# Patient Record
Sex: FEMALE | Race: WHITE | Employment: OTHER | URBAN - METROPOLITAN AREA
[De-identification: names, ages, dates, MRNs, and addresses within clinical notes are randomized per-mention and may not be internally consistent; named-entity substitution may affect disease eponyms.]

---

## 2017-01-24 ENCOUNTER — HOSPITAL ENCOUNTER (OUTPATIENT)
Dept: ULTRASOUND IMAGING | Age: 72
Discharge: HOME | End: 2017-01-24
Attending: FAMILY MEDICINE
Payer: MEDICARE

## 2017-01-24 DIAGNOSIS — R10.9 ABDOMINAL PAIN: ICD-10-CM

## 2017-01-24 PROCEDURE — 76700 US EXAM ABDOM COMPLETE: CPT

## 2017-02-09 ENCOUNTER — HOSPITAL ENCOUNTER (OUTPATIENT)
Dept: CT IMAGING | Age: 72
Discharge: HOME | End: 2017-02-09
Attending: FAMILY MEDICINE
Payer: MEDICARE

## 2017-02-09 DIAGNOSIS — N28.89 VASCULAR DISORDERS OF KIDNEY: ICD-10-CM

## 2017-02-09 PROCEDURE — 74011636320 HC RX REV CODE- 636/320: Performed by: FAMILY MEDICINE

## 2017-02-09 PROCEDURE — 74170 CT ABD WO CNTRST FLWD CNTRST: CPT

## 2017-02-09 RX ADMIN — IOPAMIDOL 97 ML: 755 INJECTION, SOLUTION INTRAVENOUS at 09:01

## 2017-02-17 ENCOUNTER — HOSPITAL ENCOUNTER (OUTPATIENT)
Dept: PREADMISSION TESTING | Age: 72
Discharge: HOME | End: 2017-02-17
Payer: MEDICARE

## 2017-02-17 VITALS
SYSTOLIC BLOOD PRESSURE: 159 MMHG | WEIGHT: 230.6 LBS | BODY MASS INDEX: 37.06 KG/M2 | HEIGHT: 66 IN | DIASTOLIC BLOOD PRESSURE: 73 MMHG | TEMPERATURE: 97.5 F | OXYGEN SATURATION: 95 % | RESPIRATION RATE: 17 BRPM

## 2017-02-17 LAB
ANION GAP BLD CALC-SCNC: 7 MMOL/L (ref 5–15)
BUN SERPL-MCNC: 12 MG/DL (ref 6–20)
BUN/CREAT SERPL: 18 (ref 12–20)
CALCIUM SERPL-MCNC: 9 MG/DL (ref 8.5–10.1)
CHLORIDE SERPL-SCNC: 102 MMOL/L (ref 97–108)
CO2 SERPL-SCNC: 32 MMOL/L (ref 21–32)
CREAT SERPL-MCNC: 0.65 MG/DL (ref 0.55–1.02)
GLUCOSE SERPL-MCNC: 182 MG/DL (ref 65–100)
POTASSIUM SERPL-SCNC: 4.9 MMOL/L (ref 3.5–5.1)
SODIUM SERPL-SCNC: 141 MMOL/L (ref 136–145)

## 2017-02-17 PROCEDURE — 93005 ELECTROCARDIOGRAM TRACING: CPT

## 2017-02-17 PROCEDURE — 80048 BASIC METABOLIC PNL TOTAL CA: CPT | Performed by: ANESTHESIOLOGY

## 2017-02-17 PROCEDURE — 36415 COLL VENOUS BLD VENIPUNCTURE: CPT | Performed by: ANESTHESIOLOGY

## 2017-02-17 NOTE — PERIOP NOTES
Pt states she stopped her ASA 81 mg over a month ago (on her own instructions as she thought you should not take it before surgery) States ASA is not prescribed by cardiology

## 2017-02-17 NOTE — PERIOP NOTES
[facility]  PREOPERATIVE INSTRUCTIONS    Surgery Date:   2/23/2017  Surgery arrival time given by surgeon: NO   If [facility] staff will call you between 4 PM- 8 PM the day before surgery with your arrival time. If your surgery is on a Monday, we will call you the preceding Friday. Please call [phone] after 8 PM if you did not receive your arrival time. 1. Please report at the designated time to the [address] [place] [address] [facility]. Bring your insurance card, photo identification, and any copayment ( if applicable). 2. You must have a responsible adult to drive you home. You need to have a responsible adult to stay with you the first 24 hours after surgery if you are going home the same day of your surgery and you should not drive a car for 24 hours following your surgery. 3. Nothing to eat or drink after midnight the night before surgery. This includes no water, gum, mints, coffee, juice, etc.  Please note special instructions, if applicable, below for medications. 4. MEDICATIONS TO TAKE THE MORNING OF SURGERY WITH A SIP OF WATER: _____gabapentin, xanax if needed_________        If needed, your prescription pain medicine may be taken with a sip of water the morning of surgery  5. No alcoholic beverages 24 hours before or after your surgery. 6. If you are being admitted to the hospital, please leave personal belongings/luggage in your car until you have an assigned hospital room number. 7. Stop Aspirin and/or any non-steroidal anti-inflammatory drugs (i.e. Ibuprofen, Naproxen, Advil, Aleve) as directed by your surgeon. You may take Tylenol. 8. Stop herbal supplements 1 week prior to surgery. 9. If you are currently taking Plavix, Coumadin,or any other blood-thinning/anticoagulant medication contact your surgeon for instructions. 10. Please wear comfortable clothes. Wear your glasses instead of contacts. We ask that all money, jewelry and valuables be left at home.  Wear no make-up, particularly mascara, the day of surgery. 11.  All body piercings, rings and jewelry need to be removed and left at home. Please wear your hair loose or down. Please no pony-tails, buns, or any metal hair accessories. If you shower the morning of surgery, please do not apply any lotions, powders, or deodorants afterwards. Do not shave any body area within 24 hours of your surgery. 12. Please follow all instructions to avoid any potential surgical cancellation. 13. Should your physical condition change, (i.e. fever, cold, flu, etc.) please notify your surgeon as soon as possible. 14. It is important to be on time. If a situation occurs where you may be delayed, please call:  [phone] / [phone] on the day of surgery. 15. The Preadmission Testing staff can be reached at [phone]. Ivan Ochoa 12. Bring your completed Medication Reconciliation sheet with your the morning of surgery  Special instructions:   · Free  Parking between [address]  The patient was contacted  in person. She  verbalizes  understanding of all instructions   Medications reviewed and med reconciliation sheets for prescriptions given to patient for review & return day of surgery.   Diabetic patients:  If allowed by your surgeon, eat a good protein snack before midnight the night before your surgery  DO NOT TAKE ANY DIABETIC MEDICINE THE MORNING OF SURGERY  Check your blood sugar the morning of surgery and if it is low you may use glucose tabs  BRING your CPAP with you day of surgery

## 2017-02-18 LAB
ATRIAL RATE: 69 BPM
CALCULATED P AXIS, ECG09: 60 DEGREES
CALCULATED R AXIS, ECG10: 22 DEGREES
CALCULATED T AXIS, ECG11: 71 DEGREES
DIAGNOSIS, 93000: NORMAL
P-R INTERVAL, ECG05: 146 MS
Q-T INTERVAL, ECG07: 422 MS
QRS DURATION, ECG06: 74 MS
QTC CALCULATION (BEZET), ECG08: 452 MS
VENTRICULAR RATE, ECG03: 69 BPM

## 2017-02-22 ENCOUNTER — ANESTHESIA EVENT (OUTPATIENT)
Dept: SURGERY | Age: 72
End: 2017-02-22
Payer: MEDICARE

## 2017-02-23 ENCOUNTER — HOSPITAL ENCOUNTER (OUTPATIENT)
Age: 72
Setting detail: OUTPATIENT SURGERY
Discharge: HOME | End: 2017-02-23
Attending: SURGERY | Admitting: SURGERY
Payer: MEDICARE

## 2017-02-23 ENCOUNTER — ANESTHESIA (OUTPATIENT)
Dept: SURGERY | Age: 72
End: 2017-02-23
Payer: MEDICARE

## 2017-02-23 VITALS
HEIGHT: 66 IN | RESPIRATION RATE: 18 BRPM | OXYGEN SATURATION: 94 % | DIASTOLIC BLOOD PRESSURE: 50 MMHG | BODY MASS INDEX: 37.06 KG/M2 | WEIGHT: 230.6 LBS | TEMPERATURE: 98 F | SYSTOLIC BLOOD PRESSURE: 148 MMHG | HEART RATE: 85 BPM

## 2017-02-23 LAB
GLUCOSE BLD STRIP.AUTO-MCNC: 220 MG/DL (ref 65–100)
GLUCOSE BLD STRIP.AUTO-MCNC: 246 MG/DL (ref 65–100)
SERVICE CMNT-IMP: ABNORMAL
SERVICE CMNT-IMP: ABNORMAL

## 2017-02-23 PROCEDURE — 76010000131 HC OR TIME 2 TO 2.5 HR: Performed by: SURGERY

## 2017-02-23 PROCEDURE — 77030020747 HC TU INSUF ENDOSC TELE -A: Performed by: SURGERY

## 2017-02-23 PROCEDURE — 74011250636 HC RX REV CODE- 250/636: Performed by: SURGERY

## 2017-02-23 PROCEDURE — 77030035051: Performed by: SURGERY

## 2017-02-23 PROCEDURE — 77030032490 HC SLV COMPR SCD KNE COVD -B

## 2017-02-23 PROCEDURE — 77030025088 HC APPL CLP LIG 1 COVD -B: Performed by: SURGERY

## 2017-02-23 PROCEDURE — 77030031139 HC SUT VCRL2 J&J -A: Performed by: SURGERY

## 2017-02-23 PROCEDURE — 77030018836 HC SOL IRR NACL ICUM -A: Performed by: SURGERY

## 2017-02-23 PROCEDURE — 77030011640 HC PAD GRND REM COVD -A: Performed by: SURGERY

## 2017-02-23 PROCEDURE — 74011000250 HC RX REV CODE- 250: Performed by: ANESTHESIOLOGY

## 2017-02-23 PROCEDURE — 77030037032 HC INSRT SCIS CLICKLLINE DISP STOR -B: Performed by: SURGERY

## 2017-02-23 PROCEDURE — 88304 TISSUE EXAM BY PATHOLOGIST: CPT | Performed by: SURGERY

## 2017-02-23 PROCEDURE — 76210000022 HC REC RM PH II 1.5 TO 2 HR: Performed by: SURGERY

## 2017-02-23 PROCEDURE — 77030032490 HC SLV COMPR SCD KNE COVD -B: Performed by: SURGERY

## 2017-02-23 PROCEDURE — 76210000000 HC OR PH I REC 2 TO 2.5 HR: Performed by: SURGERY

## 2017-02-23 PROCEDURE — 74011250636 HC RX REV CODE- 250/636

## 2017-02-23 PROCEDURE — 77030009852 HC PCH RTVR ENDOSC COVD -B: Performed by: SURGERY

## 2017-02-23 PROCEDURE — 77030020782 HC GWN BAIR PAWS FLX 3M -B

## 2017-02-23 PROCEDURE — C9290 INJ, BUPIVACAINE LIPOSOME: HCPCS | Performed by: SURGERY

## 2017-02-23 PROCEDURE — 77030002933 HC SUT MCRYL J&J -A: Performed by: SURGERY

## 2017-02-23 PROCEDURE — 74011250636 HC RX REV CODE- 250/636: Performed by: ANESTHESIOLOGY

## 2017-02-23 PROCEDURE — C1765 ADHESION BARRIER: HCPCS | Performed by: SURGERY

## 2017-02-23 PROCEDURE — 76060000035 HC ANESTHESIA 2 TO 2.5 HR: Performed by: SURGERY

## 2017-02-23 PROCEDURE — 77030035045 HC TRCR ENDOSC VRSPRT BLDLSS COVD -B: Performed by: SURGERY

## 2017-02-23 PROCEDURE — 77030008684 HC TU ET CUF COVD -B: Performed by: ANESTHESIOLOGY

## 2017-02-23 PROCEDURE — 82962 GLUCOSE BLOOD TEST: CPT

## 2017-02-23 PROCEDURE — 74011250637 HC RX REV CODE- 250/637: Performed by: SURGERY

## 2017-02-23 PROCEDURE — 77030035048 HC TRCR ENDOSC OPTCL COVD -B: Performed by: SURGERY

## 2017-02-23 PROCEDURE — 74011000250 HC RX REV CODE- 250

## 2017-02-23 PROCEDURE — 77030008771 HC TU NG SALEM SUMP -A: Performed by: ANESTHESIOLOGY

## 2017-02-23 RX ORDER — POLYETHYLENE GLYCOL 3350 17 G/17G
17 POWDER, FOR SOLUTION ORAL DAILY
Qty: 7 PACKET | Refills: 0 | Status: SHIPPED | OUTPATIENT
Start: 2017-02-23 | End: 2018-11-13

## 2017-02-23 RX ORDER — SODIUM CHLORIDE 0.9 % (FLUSH) 0.9 %
5-10 SYRINGE (ML) INJECTION AS NEEDED
Status: DISCONTINUED | OUTPATIENT
Start: 2017-02-23 | End: 2017-02-23 | Stop reason: HOSPADM

## 2017-02-23 RX ORDER — HYDROMORPHONE HYDROCHLORIDE 1 MG/ML
.25-1 INJECTION, SOLUTION INTRAMUSCULAR; INTRAVENOUS; SUBCUTANEOUS
Status: DISCONTINUED | OUTPATIENT
Start: 2017-02-23 | End: 2017-02-23 | Stop reason: HOSPADM

## 2017-02-23 RX ORDER — PROPOFOL 10 MG/ML
INJECTION, EMULSION INTRAVENOUS AS NEEDED
Status: DISCONTINUED | OUTPATIENT
Start: 2017-02-23 | End: 2017-02-23 | Stop reason: HOSPADM

## 2017-02-23 RX ORDER — DEXAMETHASONE SODIUM PHOSPHATE 4 MG/ML
INJECTION, SOLUTION INTRA-ARTICULAR; INTRALESIONAL; INTRAMUSCULAR; INTRAVENOUS; SOFT TISSUE AS NEEDED
Status: DISCONTINUED | OUTPATIENT
Start: 2017-02-23 | End: 2017-02-23 | Stop reason: HOSPADM

## 2017-02-23 RX ORDER — IBUPROFEN 200 MG
600 TABLET ORAL
Qty: 90 TAB | Refills: 1 | Status: SHIPPED | OUTPATIENT
Start: 2017-02-23 | End: 2018-11-13

## 2017-02-23 RX ORDER — TRAMADOL HYDROCHLORIDE 50 MG/1
50 TABLET ORAL
Status: DISCONTINUED | OUTPATIENT
Start: 2017-02-23 | End: 2017-02-23 | Stop reason: HOSPADM

## 2017-02-23 RX ORDER — MIDAZOLAM HYDROCHLORIDE 1 MG/ML
INJECTION, SOLUTION INTRAMUSCULAR; INTRAVENOUS AS NEEDED
Status: DISCONTINUED | OUTPATIENT
Start: 2017-02-23 | End: 2017-02-23 | Stop reason: HOSPADM

## 2017-02-23 RX ORDER — HYDROCODONE BITARTRATE AND ACETAMINOPHEN 5; 325 MG/1; MG/1
1-2 TABLET ORAL
Qty: 40 TAB | Refills: 0 | Status: SHIPPED | OUTPATIENT
Start: 2017-02-23 | End: 2018-11-13

## 2017-02-23 RX ORDER — FENTANYL CITRATE 50 UG/ML
INJECTION, SOLUTION INTRAMUSCULAR; INTRAVENOUS AS NEEDED
Status: DISCONTINUED | OUTPATIENT
Start: 2017-02-23 | End: 2017-02-23 | Stop reason: HOSPADM

## 2017-02-23 RX ORDER — PROCHLORPERAZINE MALEATE 5 MG
5 TABLET ORAL
Status: DISCONTINUED | OUTPATIENT
Start: 2017-02-23 | End: 2017-02-23 | Stop reason: HOSPADM

## 2017-02-23 RX ORDER — PROCHLORPERAZINE MALEATE 5 MG
5 TABLET ORAL
Qty: 20 TAB | Refills: 1 | Status: SHIPPED | OUTPATIENT
Start: 2017-02-23 | End: 2017-03-02

## 2017-02-23 RX ORDER — KETOROLAC TROMETHAMINE 30 MG/ML
INJECTION, SOLUTION INTRAMUSCULAR; INTRAVENOUS AS NEEDED
Status: DISCONTINUED | OUTPATIENT
Start: 2017-02-23 | End: 2017-02-23 | Stop reason: HOSPADM

## 2017-02-23 RX ORDER — TRAMADOL HYDROCHLORIDE 50 MG/1
50-100 TABLET ORAL
Status: DISCONTINUED | OUTPATIENT
Start: 2017-02-23 | End: 2017-02-23

## 2017-02-23 RX ORDER — TRAMADOL HYDROCHLORIDE 50 MG/1
100 TABLET ORAL
Status: DISCONTINUED | OUTPATIENT
Start: 2017-02-23 | End: 2017-02-23 | Stop reason: HOSPADM

## 2017-02-23 RX ORDER — FLUMAZENIL 0.1 MG/ML
0.2 INJECTION INTRAVENOUS
Status: DISCONTINUED | OUTPATIENT
Start: 2017-02-23 | End: 2017-02-23 | Stop reason: HOSPADM

## 2017-02-23 RX ORDER — DIPHENHYDRAMINE HYDROCHLORIDE 50 MG/ML
12.5 INJECTION, SOLUTION INTRAMUSCULAR; INTRAVENOUS AS NEEDED
Status: DISCONTINUED | OUTPATIENT
Start: 2017-02-23 | End: 2017-02-23 | Stop reason: HOSPADM

## 2017-02-23 RX ORDER — TRAMADOL HYDROCHLORIDE AND ACETAMINOPHEN 37.5; 325 MG/1; MG/1
1-2 TABLET ORAL
Qty: 30 TAB | Refills: 0 | Status: SHIPPED | OUTPATIENT
Start: 2017-02-23 | End: 2018-11-13

## 2017-02-23 RX ORDER — NALOXONE HYDROCHLORIDE 0.4 MG/ML
0.2 INJECTION, SOLUTION INTRAMUSCULAR; INTRAVENOUS; SUBCUTANEOUS
Status: DISCONTINUED | OUTPATIENT
Start: 2017-02-23 | End: 2017-02-23 | Stop reason: HOSPADM

## 2017-02-23 RX ORDER — LIDOCAINE HYDROCHLORIDE 20 MG/ML
INJECTION, SOLUTION EPIDURAL; INFILTRATION; INTRACAUDAL; PERINEURAL AS NEEDED
Status: DISCONTINUED | OUTPATIENT
Start: 2017-02-23 | End: 2017-02-23 | Stop reason: HOSPADM

## 2017-02-23 RX ORDER — HYDROCODONE BITARTRATE AND ACETAMINOPHEN 5; 325 MG/1; MG/1
2 TABLET ORAL
Status: COMPLETED | OUTPATIENT
Start: 2017-02-23 | End: 2017-02-23

## 2017-02-23 RX ORDER — SODIUM CHLORIDE 0.9 % (FLUSH) 0.9 %
5-10 SYRINGE (ML) INJECTION EVERY 8 HOURS
Status: DISCONTINUED | OUTPATIENT
Start: 2017-02-23 | End: 2017-02-23 | Stop reason: HOSPADM

## 2017-02-23 RX ORDER — CEFAZOLIN SODIUM IN 0.9 % NACL 2 G/50 ML
2 INTRAVENOUS SOLUTION, PIGGYBACK (ML) INTRAVENOUS ONCE
Status: DISCONTINUED | OUTPATIENT
Start: 2017-02-23 | End: 2017-02-23 | Stop reason: HOSPADM

## 2017-02-23 RX ORDER — ROCURONIUM BROMIDE 10 MG/ML
INJECTION, SOLUTION INTRAVENOUS AS NEEDED
Status: DISCONTINUED | OUTPATIENT
Start: 2017-02-23 | End: 2017-02-23 | Stop reason: HOSPADM

## 2017-02-23 RX ORDER — SODIUM CHLORIDE, SODIUM LACTATE, POTASSIUM CHLORIDE, CALCIUM CHLORIDE 600; 310; 30; 20 MG/100ML; MG/100ML; MG/100ML; MG/100ML
125 INJECTION, SOLUTION INTRAVENOUS CONTINUOUS
Status: DISCONTINUED | OUTPATIENT
Start: 2017-02-23 | End: 2017-02-23 | Stop reason: HOSPADM

## 2017-02-23 RX ORDER — OXYCODONE AND ACETAMINOPHEN 5; 325 MG/1; MG/1
1 TABLET ORAL
Status: DISCONTINUED | OUTPATIENT
Start: 2017-02-23 | End: 2017-02-23

## 2017-02-23 RX ORDER — CEFAZOLIN SODIUM 1 G/3ML
INJECTION, POWDER, FOR SOLUTION INTRAMUSCULAR; INTRAVENOUS AS NEEDED
Status: DISCONTINUED | OUTPATIENT
Start: 2017-02-23 | End: 2017-02-23 | Stop reason: HOSPADM

## 2017-02-23 RX ORDER — ONDANSETRON 2 MG/ML
INJECTION INTRAMUSCULAR; INTRAVENOUS AS NEEDED
Status: DISCONTINUED | OUTPATIENT
Start: 2017-02-23 | End: 2017-02-23 | Stop reason: HOSPADM

## 2017-02-23 RX ORDER — LIDOCAINE HYDROCHLORIDE 10 MG/ML
0.1 INJECTION, SOLUTION EPIDURAL; INFILTRATION; INTRACAUDAL; PERINEURAL AS NEEDED
Status: DISCONTINUED | OUTPATIENT
Start: 2017-02-23 | End: 2017-02-23 | Stop reason: HOSPADM

## 2017-02-23 RX ADMIN — ROCURONIUM BROMIDE 30 MG: 10 INJECTION, SOLUTION INTRAVENOUS at 08:06

## 2017-02-23 RX ADMIN — HYDROMORPHONE HYDROCHLORIDE 1 MG: 1 INJECTION, SOLUTION INTRAMUSCULAR; INTRAVENOUS; SUBCUTANEOUS at 10:32

## 2017-02-23 RX ADMIN — FENTANYL CITRATE 100 MCG: 50 INJECTION, SOLUTION INTRAMUSCULAR; INTRAVENOUS at 08:06

## 2017-02-23 RX ADMIN — LIDOCAINE HYDROCHLORIDE 80 MG: 20 INJECTION, SOLUTION EPIDURAL; INFILTRATION; INTRACAUDAL; PERINEURAL at 08:06

## 2017-02-23 RX ADMIN — HYDROCODONE BITARTRATE AND ACETAMINOPHEN 2 TABLET: 5; 325 TABLET ORAL at 13:18

## 2017-02-23 RX ADMIN — MIDAZOLAM HYDROCHLORIDE 2 MG: 1 INJECTION, SOLUTION INTRAMUSCULAR; INTRAVENOUS at 07:57

## 2017-02-23 RX ADMIN — DEXAMETHASONE SODIUM PHOSPHATE 4 MG: 4 INJECTION, SOLUTION INTRA-ARTICULAR; INTRALESIONAL; INTRAMUSCULAR; INTRAVENOUS; SOFT TISSUE at 08:06

## 2017-02-23 RX ADMIN — HYDROMORPHONE HYDROCHLORIDE 1 MG: 1 INJECTION, SOLUTION INTRAMUSCULAR; INTRAVENOUS; SUBCUTANEOUS at 11:50

## 2017-02-23 RX ADMIN — PROPOFOL 150 MG: 10 INJECTION, EMULSION INTRAVENOUS at 08:06

## 2017-02-23 RX ADMIN — HYDROMORPHONE HYDROCHLORIDE 1 MG: 1 INJECTION, SOLUTION INTRAMUSCULAR; INTRAVENOUS; SUBCUTANEOUS at 10:15

## 2017-02-23 RX ADMIN — ROCURONIUM BROMIDE 10 MG: 10 INJECTION, SOLUTION INTRAVENOUS at 08:39

## 2017-02-23 RX ADMIN — SODIUM CHLORIDE, SODIUM LACTATE, POTASSIUM CHLORIDE, AND CALCIUM CHLORIDE 125 ML/HR: 600; 310; 30; 20 INJECTION, SOLUTION INTRAVENOUS at 07:03

## 2017-02-23 RX ADMIN — FENTANYL CITRATE 50 MCG: 50 INJECTION, SOLUTION INTRAMUSCULAR; INTRAVENOUS at 09:17

## 2017-02-23 RX ADMIN — CEFAZOLIN SODIUM 2 G: 1 INJECTION, POWDER, FOR SOLUTION INTRAMUSCULAR; INTRAVENOUS at 08:06

## 2017-02-23 RX ADMIN — SODIUM CHLORIDE 12.5 MG: 9 INJECTION INTRAMUSCULAR; INTRAVENOUS; SUBCUTANEOUS at 10:19

## 2017-02-23 RX ADMIN — FENTANYL CITRATE 50 MCG: 50 INJECTION, SOLUTION INTRAMUSCULAR; INTRAVENOUS at 08:29

## 2017-02-23 RX ADMIN — FENTANYL CITRATE 50 MCG: 50 INJECTION, SOLUTION INTRAMUSCULAR; INTRAVENOUS at 08:39

## 2017-02-23 RX ADMIN — KETOROLAC TROMETHAMINE 30 MG: 30 INJECTION, SOLUTION INTRAMUSCULAR; INTRAVENOUS at 09:33

## 2017-02-23 RX ADMIN — ONDANSETRON 4 MG: 2 INJECTION INTRAMUSCULAR; INTRAVENOUS at 09:34

## 2017-02-23 NOTE — ANESTHESIA PREPROCEDURE EVALUATION
Anesthetic History     PONV          Review of Systems / Medical History  Patient summary reviewed, nursing notes reviewed and pertinent labs reviewed    Pulmonary        Sleep apnea  Smoker         Neuro/Psych   Within defined limits           Cardiovascular    Hypertension                   GI/Hepatic/Renal  Within defined limits              Endo/Other    Diabetes    Morbid obesity and arthritis     Other Findings            Physical Exam    Airway  Mallampati: II  TM Distance: 4 - 6 cm  Neck ROM: normal range of motion   Mouth opening: Normal     Cardiovascular    Rhythm: regular  Rate: normal         Dental         Pulmonary  Breath sounds clear to auscultation               Abdominal         Other Findings            Anesthetic Plan    ASA: 2  Anesthesia type: general            Anesthetic plan and risks discussed with: Patient

## 2017-02-23 NOTE — BRIEF OP NOTE
BRIEF OPERATIVE NOTE    Date of Procedure: 2/23/2017   Preoperative Diagnosis: GALLSTONES  Postoperative Diagnosis: GALLSTONES, ADHESIONS  Procedure(s):  LAPAROSCOPIC CHOLECYSTECTOMY  Lysis of adhesions right lower quadrant (one hour)  Surgeon(s) and Role:     * Claudetta Hench, MD - Primary            Surgical Staff:  Circ-1: Kalyn Thomas RN  Circ-Relief: Emerson Mays RN  Circ-Intern: Ernestina Gómez RN  Scrub Tech-1: Luba Joshi Dent  Surg Asst-1: Rafa Shaffer  Event Time In   Incision Start 0827   Incision Close 0955     Anesthesia: General   Estimated Blood Loss: Min  Specimens:   ID Type Source Tests Collected by Time Destination   1 : gallbladder and contents Preservative Gallbladder  Claudetta Hench, MD 2/23/2017 8153 Pathology      Findings: Dilated gallbladder, adhesions in RLQ dissected free with scissors.    Complications: none  Implants: * No implants in log *

## 2017-02-23 NOTE — H&P
Change to paper history and physical:    Persistent RLQ pain. H/O BTL 39 y ago. Will evaluate RLQ for adhesions or issues in addition to cholecystectomy. Addendum added to paper history and physical, as well as consent form. Initialed by Ms. Chitra Buchanan after discussion.     Tan Mejia MD

## 2017-02-23 NOTE — IP AVS SNAPSHOT
Gracie Brunner 
 
 
 [address] 
[phone] Patient: Madelin Beckett MRN: [id number] CGD:[birth year] You are allergic to the following Allergen Reactions Byetta (Exenatide) Nausea and Vomiting Metformin Nausea and Vomiting Recent Documentation Height  
  
  
  
  
  
 1.676 m Emergency Contacts Name Discharge Info Relation Home Work Mobile [address] [place] [address] CAREGIVER [3] Son [22] [phone] About your hospitalization You were admitted on:  February 23, 2017 You last received care in the:  [facility] PACU You were discharged on:  February 23, 2017 Unit phone number:  [phone] Why you were hospitalized Your primary diagnosis was:  Not on File Providers Seen During Your Hospitalizations Provider Role Specialty Primary office phone Claudetta Hench, MD Attending Provider General Surgery [phone] Your Primary Care Physician (PCP) Primary Care Physician Office Phone Office Fax Nish Garcia [phone] Follow-up Information Follow up With Details Comments Contact Info Claudetta Hench, MD Schedule an appointment as soon as possible for a visit in 2 weeks  [address] [facility] [address] 
[phone] Merced Whitley MD   [address] [facility] [address] [place] [address] 
[phone] Current Discharge Medication List  
  
START taking these medications Dose & Instructions Dispensing Information Comments Morning Noon Evening Bedtime  
 docusate sodium 50 mg capsule Commonly known as:  Doc Kiara Your next dose is: Today, Tomorrow Other:  _________ Dose:  100 mg Take 2 Caps by mouth two (2) times a day for 90 days. Indications: Constipation Quantity:  120 Cap Refills:  2  
     
   
   
   
  
 ibuprofen 200 mg tablet Commonly known as:  MOTRIN Your next dose is: Today, Tomorrow Other:  _________ Dose:  600 mg Take 3 Tabs by mouth Before breakfast, lunch, and dinner. Indications: Pain Quantity:  90 Tab Refills:  1  
     
   
   
   
  
 polyethylene glycol 17 gram packet Commonly known as:  Kael Boer Your next dose is: Today, Tomorrow Other:  _________ Dose:  17 g Take 1 Packet by mouth daily. Indications: Constipation, If constipation last more than 24-48 hours, despite colace use. Quantity:  7 Packet Refills:  0  
     
   
   
   
  
 prochlorperazine 5 mg tablet Commonly known as:  COMPAZINE Your next dose is: Today, Tomorrow Other:  _________ Dose:  5 mg Take 1 Tab by mouth every six (6) hours as needed for up to 7 days. Indications: NAUSEA AND VOMITING Quantity:  20 Tab Refills:  1  
     
   
   
   
  
 traMADol-acetaminophen 37.5-325 mg per tablet Commonly known as:  ULTRACET Your next dose is: Today, Tomorrow Other:  _________ Dose:  1-2 Tab Take 1-2 Tabs by mouth every six (6) hours as needed for Pain. Max Daily Amount: 8 Tabs. Indications: Pain Quantity:  30 Tab Refills:  0 CONTINUE these medications which have NOT CHANGED Dose & Instructions Dispensing Information Comments Morning Noon Evening Bedtime  
 aspirin delayed-release 81 mg tablet Your next dose is: Today, Tomorrow Other:  _________ Take  by mouth daily. Refills:  0  
     
   
   
   
  
 GABAPENTIN (BULK) Your next dose is: Today, Tomorrow Other:  _________ Dose:  100 mg Take 100 mg by mouth two (2) times a day. Refills:  0  
     
   
   
   
  
 insulin detemir 100 unit/mL (3 mL) Inpn Commonly known as:  Vernia Goetz Your next dose is: Today, Tomorrow Other:  _________ Dose:  48 Units 48 Units by SubCUTAneous route Before breakfast and dinner. Refills:  0  
     
   
   
   
  
 lisinopril 5 mg tablet Commonly known as:  Marge Lisa Your next dose is: Today, Tomorrow Other:  _________ Take  by mouth daily. Refills:  0 NovoLOG Flexpen 100 unit/mL Inpn Generic drug:  insulin aspart Your next dose is: Today, Tomorrow Other:  _________ Dose:  10 Units 10 Units by SubCUTAneous route Before breakfast, lunch, and dinner. Refills:  0  
     
   
   
   
  
 traMADol 50 mg tablet Commonly known as:  ULTRAM  
   
Your next dose is: Today, Tomorrow Other:  _________ Dose:  50 mg Take 50 mg by mouth every six (6) hours as needed for Pain. Refills:  0  
     
   
   
   
  
 XANAX 1 mg tablet Generic drug:  ALPRAZolam  
   
Your next dose is: Today, Tomorrow Other:  _________ Dose:  0.5 mg Take 0.5 mg by mouth two (2) times daily as needed. Refills:  0 Where to Get Your Medications Information on where to get these meds will be given to you by the nurse or doctor. ! Ask your nurse or doctor about these medications  
  docusate sodium 50 mg capsule  
 ibuprofen 200 mg tablet  
 polyethylene glycol 17 gram packet  
 prochlorperazine 5 mg tablet  
 traMADol-acetaminophen 37.5-325 mg per tablet Discharge Instructions PATIENT INSTRUCTIONS 
GALL BLADDER SURGERY 
(CHOLECYSTECTOMY) FOLLOW-UP:  Please make an appointment with your physician in 2 week(s). Call your physician immediately if you have any fevers greater than 102.5, drainage from your wound that is not clear or looks infected, persistent bleeding, increasing abdominal pain, problems urinating, or persistent nausea/vomiting.   You should be aware that you may have right shoulder pain after surgery and that this will progressively go away. This is called 'referred pain' and is from the area of the gallbladder. It can also be caused by gas that may be trapped under the diaphragm from the surgery, especially if it was performed laparoscopically through mini-incisions. This gas will progressively get reabsorbed by your body. WOUND CARE INSTRUCTIONS:  Keep a dry clean dressing on the wound if there is drainage. The initial bandage may be removed after 24 hours. Once the wound has quit draining you may leave it open to air. If clothing rubs against the wound or causes irritation and the wound is not draining you may cover it with a dry dressing during the daytime. Try to keep the wound dry and avoid ointments on the wound unless directed to do so. If the wound becomes bright red and painful or starts to drain infected material that is not clear, please contact your physician immediately. You should also call if you begin to drain fluid that is thin and greenish-brown from the wound and appears to look like bile. If the wound though is mildly pink and has a thick firm ridge underneath it, this is normal, and is referred to as a healing ridge. This will resolve over the next 4-6 weeks. DIET:  You may eat any foods that you can tolerate. It is a good idea to eat a high fiber diet and take in plenty of fluids to prevent constipation. If you do become constipated you may want to take a mild laxative or take ducolax tablets on a daily basis until your bowel habits are regular. Constipation can be very uncomfortable, along with straining, after recent abdominal surgery. ACTIVITY:  You are encouraged to cough and deep breath or use your incentive spirometer if you were given one, every 15-30 minutes when awake. This will help prevent respiratory complications and low grade fevers post-operatively.   You may want to hug a pillow when coughing and sneezing to add additional support to the surgical area(s) which will decrease pain during these times. You are encouraged to walk and engage in light activity for the next two weeks. You should not lift more than 20 pounds during this time frame as it could put you at increased risk for a post-operative hernia. Twenty pounds is roughly equivalent to a plastic bag of groceries. MEDICATIONS:  Try to take narcotic medications and anti-inflammatory medications, such as tylenol, ibuprofen, naprosyn, etc., with food. This will minimize stomach upset from the medication. Should you develop nausea and vomiting from the pain medication, or develop a rash, please discontinue the medication and contact your physician. You should not drive, make important decisions, or operate machinery when taking narcotic pain medication. QUESTIONS:  Please feel free to call your physician or the hospital  if you have any questions, and they will be glad to assist you. DISCHARGE SUMMARY from your Nurse The following personal items collected during your admission are returned to you:  
Dental Appliance: Dental Appliances: None Vision:   
Hearing Aid:   
Jewelry: Jewelry: None Clothing: Clothing: Other (comment) (street clothes to locker) Other Valuables: Other Valuables: Eyeglasses (to locker) Valuables sent to safe:   
 
 
PATIENT INSTRUCTIONS: 
 
After general anesthesia or intravenous sedation, for 24 hours or while taking prescription Narcotics: · Limit your activities · Do not drive and operate hazardous machinery · Do not make important personal or business decisions · Do  not drink alcoholic beverages · If you have not urinated within 8 hours after discharge, please contact your surgeon on call. Report the following to your surgeon: 
· Excessive pain, swelling, redness or odor of or around the surgical area · Temperature over 100.5 · Nausea and vomiting lasting longer than 4 hours or if unable to take medications · Any signs of decreased circulation or nerve impairment to extremity: change in color, persistent  numbness, tingling, coldness or increase pain · Any questions INCENTIVE SPIROMETER Your nurse may send an incentive spirometer home with you to help you with your breathing. The incentive spirometer provides another way to make the lungs work better. DIRECTIONS: 
1. Exhale - begin with empty lungs. 2. Place lips around the mouthpiece; take a SLOW and DEEP breath in. 
3. Keep the small blue \"float\" on the RIGHT between the top and bottom arrows. If you suck the small blue float all the way to the top, YOU ARE CHEATING. SLOW DOWN when you breathe in. 
4. Your nurse will help you decide your target level for the big blue \"float\" that rises. Usually, that number is over the 1500 level. Your goal number is based upon your height and age, giving an estimate goal for your lung volume. 5. The arrow on the left side marks your goal.  Always try to go past your goal. Raise the arrow when you make a new goal.   
6. When you take a full deep breath in, hold it for 2 seconds. Exhale normally. Don't be afraid to push yourself; rest a little between each attempt. Use the Incentive spirometer 10 times every hour while awake. It is OK to break the 10 to 12 attempts into smaller numbers if this exercise makes your tired. For example, perform 2 times every 10 minutes. Below is information about the medications your doctor is prescribing after your visit: Ankle Pumps Ankle pumps increase the circulation of oxygenated blood to your lower extremities and decrease your risk for circulation problems such as blood clots. They also stretch the muscles, tendons and ligaments in your foot and ankle, and prevent joint contracture in the ankle and foot, especially after surgeries on the legs. It is important to do ankle pump exercises regularly after surgery because immobility increases your risk for developing a blood clot. Your doctor may also have you take an Aspirin for the next few days as well. If your doctor did not ask you to take an Aspirin, consult with him before starting Aspirin therapy on your own. The exercise is quite simple. 6. Slowly point your foot forward, feeling the muscles on the top of your lower leg stretch, and hold this position for 5 seconds. 7. Next, pull your foot back toward you as far as possible, stretching the calf muscles, and hold that position for 5 seconds. 8. Repeat with the other foot. 9. Perform 10 repetitions every hour while awake for both ankles if possible (down and then up with the foot once is one repetition). You should feel gentle stretching of the muscles in your lower leg when doing this exercise. If you feel pain, or your range of motion is limited, don't  Push too hard. Only go the limit your joint and muscles will let you go. If you have increasing pain, progressively worsening leg warmth or swelling, STOP the exercise and call your doctor. Other information in your discharge envelope: PRESCRIPTIONS PHYSICAL THERAPY PRESCRIPTION 
     APPOINTMENT CARDS [facility] Pamphlet for block or block with On-Q Catheter from Anesthesia Service Medical device information sheets/pamphlets from their  School/work excuse note. /parent work excuse note. These are general instructions for a healthy lifestyle: *  Please give a list of your current medications to your Primary Care Provider. *  Please update this list whenever your medications are discontinued, doses are 
    changed, or new medications (including over-the-counter products) are added. *  Please carry medication information at all times in case of emergency situations. No smoking / No tobacco products / Avoid exposure to second hand smoke Surgeon General's Warning:  Quitting smoking now greatly reduces serious risk to your health. Obesity, smoking, and sedentary lifestyle greatly increases your risk for illness and disease. A healthy diet, regular physical exercise & weight monitoring are important for maintaining a healthy lifestyle. Congestive Heart Failure You may be retaining fluid if you have a history of heart failure or if you experience any of the following symptoms:  Weight gain of 3 pounds or more overnight or 5 pounds in a week, increased swelling in our hands or feet or shortness of breath while lying flat in bed. Please call your doctor as soon as you notice any of these symptoms; do not wait until your next office visit. Recognize signs and symptoms of STROKE: 
F - face looks uneven A - arms unable to move or move even S - speech slurred or non-existent T - time-call 911 as soon as signs and symptoms begin-DO NOT go Back to bed or wait to see if you get better-TIME IS BRAIN. Warning signs of Heart Attack Call 911 if you have these symptoms: · Chest discomfort. Most heart attacks involve discomfort in the center of the chest that lasts more than a few minutes, or that goes away and comes back. It can feel like uncomfortable pressure, squeezing, fullness, or pain. · Discomfort in other areas of the upper body. Symptoms can include pain or discomfort in one or both Arms, the back, neck, jaw, or stomach. ·  Shortness of breath with or without chest discomfort. · Other signs may include breaking out in a cold sweat, nausea, or lightheadedness Don't wait more than five minutes to call 911 - MINUTES MATTER! Fast action can save your life. Calling 911 is almost always the fastest way to get lifesaving treatment.   Emergency Medical Services staff can begin treatment when they arrive - up to an hour sooner than if someone gets to the hospital by car. DAWNA LORENZANA MEDICATION AND SIDE EFFECTS GUIDE The [address] [facility] EFFECT GUIDE was provided to the PATIENT AND CARE PROVIDER. Information provided includes instruction about drug purpose and common side effects for the following medications: · Compazine · Miralax · Ibuprofen · Tramadol-acetaminophen · Docusate Discharge Orders None [employer] Announcement We are excited to announce that we are making your provider's discharge notes available to you in [employer]. You will see these notes when they are completed and signed by the physician that discharged you from your recent hospital stay. If you have any questions or concerns about any information you see in [employer], please call the Health Information Department where you were seen or reach out to your Primary Care Provider for more information about your plan of care. Introducing \[facility]\"" & HEALTH SERVICES! Lisandro Rutherford introduces [employer] patient portal. Now you can access parts of your medical record, email your doctor's office, and request medication refills online. 1. In your internet browser, go to [email or web address] 2. Click on the First Time User? Click Here link in the Sign In box. You will see the New Member Sign Up page. 3. Enter your [employer] Access Code exactly as it appears below. You will not need to use this code after youve completed the sign-up process. If you do not sign up before the expiration date, you must request a new code. · [employer] Access Code: [email or web address] Expires: 4/17/2017  3:41 PM 
 
4. Enter the last four digits of your Social Security Number (xxxx) and Date of Birth (mm/dd/yyyy) as indicated and click Submit. You will be taken to the next sign-up page. 5. Create a [employer] ID.  This will be your [employer] login ID and cannot be changed, so think of one that is secure and easy to remember. 6. Create a [employer] password. You can change your password at any time. 7. Enter your Password Reset Question and Answer. This can be used at a later time if you forget your password. 8. Enter your e-mail address. You will receive e-mail notification when new information is available in [address] 9. Click Sign Up. You can now view and download portions of your medical record. 10. Click the Download Summary menu link to download a portable copy of your medical information. If you have questions, please visit the Frequently Asked Questions section of the [employer] website. Remember, [employer] is NOT to be used for urgent needs. For medical emergencies, dial 911. Now available from your iPhone and Android! General Information Please provide this summary of care documentation to your next provider. Patient Signature:  ____________________________________________________________ Date:  ____________________________________________________________  
  
Yesi Moise Provider Signature:  ____________________________________________________________ Date:  ____________________________________________________________

## 2017-02-23 NOTE — DISCHARGE SUMMARY
Discharge Summary    Patient: Michael Hitchcock               Sex: female          DOA: 2/23/2017  6:13 AM       YOB: 1945      Age:  73 y.o.        LOS:  LOS: 0 days                Discharge Date:      Admission Diagnoses: GALLSTONES    Discharge Diagnoses:  Same, adhesions    Procedure:  Procedure(s):  LAPAROSCOPIC CHOLECYSTECTOMY, Lysis of adhesions right lower quadrant    Discharge Condition: Good    Hospital Course: Uremarkable operative procedure. Discharge to home in stable condition. Consults: None    Significant Diagnostic Studies: See full electronic record. Discharge Medications:     Current Discharge Medication List      START taking these medications    Details   prochlorperazine (COMPAZINE) 5 mg tablet Take 1 Tab by mouth every six (6) hours as needed for up to 7 days. Indications: NAUSEA AND VOMITING  Qty: 20 Tab, Refills: 1      polyethylene glycol (MIRALAX) 17 gram packet Take 1 Packet by mouth daily. Indications: Constipation, If constipation last more than 24-48 hours, despite colace use. Qty: 7 Packet, Refills: 0      docusate sodium (COLACE) 50 mg capsule Take 2 Caps by mouth two (2) times a day for 90 days. Indications: Constipation  Qty: 120 Cap, Refills: 2      ibuprofen (MOTRIN) 200 mg tablet Take 3 Tabs by mouth Before breakfast, lunch, and dinner. Indications: Pain  Qty: 90 Tab, Refills: 1      traMADol-acetaminophen (ULTRACET) 37.5-325 mg per tablet Take 1-2 Tabs by mouth every six (6) hours as needed for Pain. Max Daily Amount: 8 Tabs. Indications: Pain  Qty: 30 Tab, Refills: 0         CONTINUE these medications which have NOT CHANGED    Details   insulin detemir (LEVEMIR FLEXTOUCH) 100 unit/mL (3 mL) inpn 48 Units by SubCUTAneous route Before breakfast and dinner. traMADol (ULTRAM) 50 mg tablet Take 50 mg by mouth every six (6) hours as needed for Pain. GABAPENTIN, BULK, Take 100 mg by mouth two (2) times a day.       lisinopril (PRINIVIL, ZESTRIL) 5 mg tablet Take  by mouth daily. ALPRAZolam (XANAX) 1 mg tablet Take 0.5 mg by mouth two (2) times daily as needed. insulin aspart (NOVOLOG FLEXPEN) 100 unit/mL flexpen 10 Units by SubCUTAneous route Before breakfast, lunch, and dinner. aspirin delayed-release 81 mg tablet Take  by mouth daily. Activity/Diet/Wound Care: See patient administered discharge instructions.     Follow-up: 2 weeks    Debbie Deng MD  [facility]  Office:  [phone]

## 2017-02-23 NOTE — PERIOP NOTES
Report received from [facility], IV removed, pt assisted to wheelchair and out with family.

## 2017-02-23 NOTE — ANESTHESIA POSTPROCEDURE EVALUATION
Post-Anesthesia Evaluation and Assessment    Patient: Maryann Callahan MRN: [id number]  SSN: [id number]    YOB: 1945  Age: 73 y.o. Sex: female       Cardiovascular Function/Vital Signs  Visit Vitals    /70    Pulse 86    Temp 36.4 °C (97.5 °F)    Resp 14    Ht 5' 6\" (1.676 m)    Wt 104.6 kg (230 lb 9.6 oz)    SpO2 99%    BMI 37.22 kg/m2       Patient is status post general anesthesia for Procedure(s):  LAPAROSCOPIC CHOLECYSTECTOMY, Lysis of adhesions right lower quadrant. Nausea/Vomiting: None    Postoperative hydration reviewed and adequate. Pain:  Pain Scale 1: Numeric (0 - 10) (02/23/17 1151)  Pain Intensity 1: 6 (02/23/17 1151)   Managed    Neurological Status:   Neuro (WDL): Exceptions to WDL (02/23/17 1045)  Neuro  Neurologic State: Drowsy (02/23/17 1045)  LUE Motor Response: Tingling;Numbness (in hands at times) (02/23/17 0725)  RUE Motor Response: Numbness;Tingling (in hands at times) (02/23/17 0725)   At baseline    Mental Status and Level of Consciousness: Arousable    Pulmonary Status:   O2 Device: Nasal cannula (02/23/17 1045)   Adequate oxygenation and airway patent    Complications related to anesthesia: None    Post-anesthesia assessment completed.  No concerns    Signed By: Cherry Youssef MD     February 23, 2017

## 2017-03-11 NOTE — OP NOTES
OPERATIVE NOTE    Date of Procedure: 2/23/2017   Preoperative Diagnosis: SYMPTOMATIC CHOLELITHIASIS, RIGHT LOWER QUADRANT ABDOMINAL PAIN  Postoperative Diagnosis: SAME, ADHESIONS  Procedure(s):  LAPAROSCOPIC CHOLECYSTECTOMY  Lysis of adhesions right lower quadrant (one hour)  Surgeon(s) and Role:     * Paul Marie MD - Primary            Surgical Staff:  Circ-1: Fifi Graham RN  Circ-Relief: Michael Mays RN  Circ-Intern: Jonathon Cobos RN  Scrub Tech-1: Anika Moyer  Surg Asst-1: Rosa Galvan  Event Time In   Incision Start 0827   Incision Close 0955     Anesthesia: General   Estimated Blood Loss: Min  Specimens:   ID Type Source Tests Collected by Time Destination   1 : gallbladder and contents Preservative Gallbladder  Paul Marie MD 2/23/2017 7228 Pathology      Findings: Dilated gallbladder, adhesions in RLQ dissected free with scissors. Complications: none  Implants: * No implants in log *    Indication:  See paper H/P. Procedure:  I asked the patient to identify the location of abdominal pain in the holding area, and marked the area with an indelible marker. After standard pre-anesthetic and pre-operative procedure nursing protocols, general anesthesia instituted. Wth the patient supine on the operating table, the abdomen was cleaned, prepped, and draped in usual sterile fashion. The laparoscopic camera and CO2 insufflation apparatus were affixed to the drapes in the usual fashion. Pre-incision antibiotics were given 30 minutes prior to skin incision. Pre-incision liposomal bupivicaine and 0.5% plain marcaine anesthetic was injected in the usual port sites of the skin. Through a 5mm horizontal right para-umbilical skin incision, the abdomen was entered under direct camera vision with a 5 mm blunt tissue dissecting port. Insufflation was established satisfactorily and maintained at 15mm.   The laparoscopic camera was re-introduced and confirmed no gross evidence of intraabdominal visceral injury or bleeding in attaining access. Final midline horizontal port incisions were made, and under direct laparoscopic vision 5 mm and 12mm ventral ports were introduced. The patient was positioned in reverse Trendelenburg with left tilt. The fundus was grasped and lifted up towards the diaphragm. The infundibulum was retracted laterally and inferiorly. There was thickened peritoneum here and required meticulous dissection in order to completely free the infundibulum and cystic duct. The junction of the cystic duct and gallbladder were clearly identified, and an adequate length of the cystic duct was dissected out. Similarly, the cystic artery was also dissected out and an adequate length was displayed. The critical view of Calot's triangle was obtained and the structures were clearly identified. The cystic duct was clipped high on the infundibulum as possible. Proximal cystic duct was then clipped three times and divided with laparoscopic scissors. The cystic artery was clipped twice on the stay side and divided with laparoscopic scissors. With electrocautery, the gallbladder was then gently dissected completely from the liver bed and placed in a retrieval bag. Attention was turned to the right lower quadrant. There were intra-abdominal adhesions adjacent to the area marked on her skin surface in the right lower quadrant. The cecum and ascending colon were encased in adhesions. After an hour, I was able to laparoscopically cut away the adhesions to allow  a more normal position of the anti-mesenteric cecum and right colon. Hemostasis was satisfactory. The 5mm ports were removed under direct laparoscopic vision with no concern for preperitoneal or rectus arterial or venous bleeding. The remaining local anesthetic was injected in the pre-peritoneal plane, fascia and subcutaneous tissue at each trocar site under direct endoscopic vision.   The gallbladder, instruments and ports were removed from the field and pneumoperitoneum terminally released. All skin incisions were closed with subcuticular 4-0 Monocryl, steristrips and tegaderm. The patient awoke in satisfactory condition. I went to discuss the findings with her family members in the waiting area.       Selina Crandall MD

## 2017-03-17 ENCOUNTER — APPOINTMENT (OUTPATIENT)
Dept: GENERAL RADIOLOGY | Age: 72
End: 2017-03-17
Attending: EMERGENCY MEDICINE
Payer: MEDICARE

## 2017-03-17 ENCOUNTER — HOSPITAL ENCOUNTER (EMERGENCY)
Age: 72
Discharge: HOME | End: 2017-03-17
Attending: EMERGENCY MEDICINE
Payer: MEDICARE

## 2017-03-17 VITALS
OXYGEN SATURATION: 93 % | RESPIRATION RATE: 20 BRPM | TEMPERATURE: 97.9 F | HEIGHT: 66 IN | BODY MASS INDEX: 36.32 KG/M2 | SYSTOLIC BLOOD PRESSURE: 144 MMHG | HEART RATE: 83 BPM | DIASTOLIC BLOOD PRESSURE: 68 MMHG | WEIGHT: 226 LBS

## 2017-03-17 DIAGNOSIS — M79.602 PAIN OF LEFT UPPER EXTREMITY: Primary | ICD-10-CM

## 2017-03-17 PROCEDURE — 99282 EMERGENCY DEPT VISIT SF MDM: CPT

## 2017-03-17 PROCEDURE — 72050 X-RAY EXAM NECK SPINE 4/5VWS: CPT

## 2017-03-17 RX ORDER — PREDNISONE 20 MG/1
TABLET ORAL
Qty: 20 TAB | Refills: 0 | Status: SHIPPED | OUTPATIENT
Start: 2017-03-17 | End: 2018-11-13

## 2017-03-17 RX ORDER — DICLOFENAC SODIUM 50 MG/1
50 TABLET, DELAYED RELEASE ORAL 2 TIMES DAILY
Qty: 20 TAB | Refills: 0 | Status: SHIPPED | OUTPATIENT
Start: 2017-03-17 | End: 2018-11-13

## 2017-03-17 NOTE — DISCHARGE INSTRUCTIONS
Pinched Nerve in the Neck: Care Instructions  Your Care Instructions  A pinched nerve in the neck happens when a vertebra or disc in the upper part of your spine is damaged. This damage can happen because of an injury. Or it can just happen with age. The changes caused by the damage may put pressure on a nearby nerve root, pinching it. This causes symptoms such as sharp pain in your neck, shoulder, arm, or back. You may also have tingling or numbness. Sometimes it makes your arm weaker. The symptoms are usually worse when you turn your head or strain your neck. For many people, the symptoms get better over time and finally go away. Early treatment usually includes medicines for pain and swelling. Sometimes physical therapy and special exercises may help. Follow-up care is a key part of your treatment and safety. Be sure to make and go to all appointments, and call your doctor if you are having problems. It's also a good idea to know your test results and keep a list of the medicines you take. How can you care for yourself at home? · Be safe with medicines. Read and follow all instructions on the label. ¨ If the doctor gave you a prescription medicine for pain, take it as prescribed. ¨ If you are not taking a prescription pain medicine, ask your doctor if you can take an over-the-counter medicine. · Try using a heating pad on a low or medium setting for 15 to 20 minutes every 2 or 3 hours. Try a warm shower in place of one session with the heating pad. You can also buy single-use heat wraps that last up to 8 hours. · You can also try an ice pack for 10 to 15 minutes every 2 to 3 hours. There isn't strong evidence that either heat or ice will help. But you can try them to see if they help you. · Don't spend too long in one position. Take short breaks to move around and change positions. · Wear a seat belt and shoulder harness when you are in a car.   · Sleep with a pillow under your head and neck that keeps your neck straight. · If you were given a neck brace (cervical collar) to limit neck motion, wear it as instructed for as many days as your doctor tells you to. Do not wear it longer than you were told to. Wearing a brace for too long can lead to neck stiffness and can weaken the neck muscles. · Follow your doctor's instructions for gentle neck-stretching exercises. · Do not smoke. Smoking can slow healing of your discs. If you need help quitting, talk to your doctor about stop-smoking programs and medicines. These can increase your chances of quitting for good. · Avoid strenuous work or exercise until your doctor says it is okay. When should you call for help? Call 911 anytime you think you may need emergency care. For example, call if:  · You are unable to move an arm or a leg at all. Call your doctor now or seek immediate medical care if:  · You have new or worse symptoms in your arms, legs, chest, belly, or buttocks. Symptoms may include:  ¨ Numbness or tingling. ¨ Weakness. ¨ Pain. · You lose bladder or bowel control. Watch closely for changes in your health, and be sure to contact your doctor if:  · You are not getting better as expected. Where can you learn more? Go to http://mark-nicole.info/. Enter Y179 in the search box to learn more about \"Pinched Nerve in the Neck: Care Instructions. \"  Current as of: May 23, 2016  Content Version: 11.1  © 0268-5046 [employer]. Care instructions adapted under license by [employer] (which disclaims liability or warranty for this information). If you have questions about a medical condition or this instruction, always ask your healthcare professional. [place] [address] any warranty or liability for your use of this information.

## 2017-03-17 NOTE — ED TRIAGE NOTES
72 y/o female presents to ED complaining of intermittent left arm pain that starts at the fingers and radiates to shoulder x 2 months.

## 2017-03-17 NOTE — ED PROVIDER NOTES
HPI Comments: 73 y.o. female with past medical history significant for DM, arthritis, kidney mass, and anxiety/depression who presents from home with chief complaint of left arm pain. Pt complains of intermittent, non-mechanical, left shoulder and trapezius area pain with radiation to the the hand for the past 2 months. Pt describes that pain was b/l but right arm sxs recently subsided. Pt reports chronic tingling in her hands b/l every morning. Pt denies leg swelling. There are no other acute medical concerns at this time. Social hx: retired ; former tobacco smoker. PCP: Majo Covington MD    Note written by Arielle Kay. Nalini Rutherford, as dictated by Ami Leong MD 7:39 AM      The history is provided by the patient. No  was used. Past Medical History:   Diagnosis Date    Anxiety and depression     anxiety/depression    Arthritis     osteo    Chronic pain     knee pain,jointpain,muscle pain, Back pain    DM (diabetes mellitus) ([address]     Kidney mass     mass on kidney    Nausea & vomiting     Sleep apnea        Past Surgical History:   Procedure Laterality Date    BREAST SURGERY PROCEDURE UNLISTED      breast biopsy    HX OTHER SURGICAL      left foot surgery. 18srews, 3plates         Family History:   Problem Relation Age of Onset    Heart Disease Mother     Stroke Mother     Hypertension Father     Diabetes Sister        Social History     Social History    Marital status: SINGLE     Spouse name: N/A    Number of children: N/A    Years of education: N/A     Occupational History    Not on file.      Social History Main Topics    Smoking status: Former Smoker     Packs/day: 2.00     Years: 25.00     Quit date: 11/22/2002    Smokeless tobacco: Never Used    Alcohol use No    Drug use: No    Sexual activity: No     Other Topics Concern    Not on file     Social History Narrative         ALLERGIES: Byetta [exenatide] and Metformin    Review of Systems   Constitutional: Negative for fever. Eyes: Negative for visual disturbance. Respiratory: Negative for cough, shortness of breath and wheezing. Cardiovascular: Negative for chest pain and leg swelling. Gastrointestinal: Negative for abdominal pain, diarrhea, nausea and vomiting. Genitourinary: Negative for dysuria. Musculoskeletal: Negative. Negative for back pain and neck stiffness. L shoulder and trapezius area pain   Skin: Negative for rash. Neurological: Negative. Negative for syncope and headaches. Psychiatric/Behavioral: Negative for confusion. All other systems reviewed and are negative. Vitals:    03/17/17 0716   BP: 144/68   Pulse: 83   Resp: 20   Temp: 97.9 °F (36.6 °C)   SpO2: 93%   Weight: 102.5 kg (226 lb)   Height: 5' 6\" (1.676 m)            Physical Exam   Constitutional: She appears well-developed and well-nourished. No distress. HENT:   Head: Normocephalic. Eyes: Pupils are equal, round, and reactive to light. Neck: Normal range of motion. Neck supple. No muscular tenderness present. Cardiovascular: Normal rate, regular rhythm and normal pulses. No murmur heard. Pulmonary/Chest: Effort normal and breath sounds normal. No respiratory distress. Abdominal: Soft. There is no tenderness. Musculoskeletal: Normal range of motion. She exhibits no edema. Left arm non-tender   Neurological: She is alert. She has normal strength. No cranial nerve deficit. Normal ulnar radial nerve   Skin: Skin is warm and dry. Psychiatric: She has a normal mood and affect. Her behavior is normal.   Nursing note and vitals reviewed. Note written by Olivia Verde. Natividad Sims, as dictated by Hilda Paget, MD 7:36 AM        [facility]  ED Course       Procedures    Discussed test results, clinical impression,  and need for followup in 1-2 days if not improving. Patients questions were answered. Discharge instructions given to patient.

## 2018-02-07 ENCOUNTER — HOSPITAL ENCOUNTER (OUTPATIENT)
Dept: CT IMAGING | Age: 73
Discharge: HOME | End: 2018-02-07
Attending: FAMILY MEDICINE
Payer: SELF-PAY

## 2018-02-07 DIAGNOSIS — R07.89 OTHER CHEST PAIN: ICD-10-CM

## 2018-02-07 PROCEDURE — 75571 CT HRT W/O DYE W/CA TEST: CPT

## 2018-02-07 NOTE — CARDIO/PULMONARY
Cardiac Rehab: Spoke with patient about Calcium Scoring results 358. Discussed significance of results, and CAD risk factors. Pt reported that she already had appointment for Ca scoring scheduled. She had chest pain in January and went to [address] [place] [address] where Dr. Ethan Patino did cardiac cath (1/17/18) which was \"normal\" per patient. Pt reported he came today anyway and had CT scan. She reported she has HTN, high cholesterol, and DM; denies smoking. Discussed heart healthy/low sodium diet management and exercise. Pt indicated she would f/u with Dr Ethan Patino on 2/9/18 for post cath and needs to make appt with Dr. Lucina Marte as PCP. Ivonne Sands    Copy of report sent to patient, at his request.

## 2018-11-10 ENCOUNTER — HOSPITAL ENCOUNTER (EMERGENCY)
Age: 73
Discharge: HOME | DRG: 194 | End: 2018-11-10
Attending: EMERGENCY MEDICINE
Payer: MEDICARE

## 2018-11-10 ENCOUNTER — APPOINTMENT (OUTPATIENT)
Dept: CT IMAGING | Age: 73
DRG: 194 | End: 2018-11-10
Attending: EMERGENCY MEDICINE
Payer: MEDICARE

## 2018-11-10 ENCOUNTER — APPOINTMENT (OUTPATIENT)
Dept: GENERAL RADIOLOGY | Age: 73
DRG: 194 | End: 2018-11-10
Attending: EMERGENCY MEDICINE
Payer: MEDICARE

## 2018-11-10 VITALS
TEMPERATURE: 99.5 F | DIASTOLIC BLOOD PRESSURE: 84 MMHG | HEART RATE: 96 BPM | BODY MASS INDEX: 36.32 KG/M2 | WEIGHT: 226 LBS | RESPIRATION RATE: 21 BRPM | HEIGHT: 66 IN | OXYGEN SATURATION: 93 % | SYSTOLIC BLOOD PRESSURE: 192 MMHG

## 2018-11-10 DIAGNOSIS — R09.02 HYPOXIA: Primary | ICD-10-CM

## 2018-11-10 DIAGNOSIS — J18.9 COMMUNITY ACQUIRED PNEUMONIA, UNSPECIFIED LATERALITY: ICD-10-CM

## 2018-11-10 DIAGNOSIS — R04.2 HEMOPTYSIS: ICD-10-CM

## 2018-11-10 LAB
ANION GAP SERPL CALC-SCNC: 8 MMOL/L (ref 5–15)
BASOPHILS # BLD: 0 K/UL (ref 0–0.1)
BASOPHILS NFR BLD: 0 % (ref 0–1)
BNP SERPL-MCNC: 125 PG/ML (ref 0–125)
BUN SERPL-MCNC: 11 MG/DL (ref 6–20)
BUN/CREAT SERPL: 15 (ref 12–20)
CALCIUM SERPL-MCNC: 8.2 MG/DL (ref 8.5–10.1)
CHLORIDE SERPL-SCNC: 104 MMOL/L (ref 97–108)
CO2 SERPL-SCNC: 30 MMOL/L (ref 21–32)
CREAT SERPL-MCNC: 0.73 MG/DL (ref 0.55–1.02)
DIFFERENTIAL METHOD BLD: ABNORMAL
EOSINOPHIL # BLD: 0 K/UL (ref 0–0.4)
EOSINOPHIL NFR BLD: 0 % (ref 0–7)
ERYTHROCYTE [DISTWIDTH] IN BLOOD BY AUTOMATED COUNT: 12.5 % (ref 11.5–14.5)
GLUCOSE SERPL-MCNC: 219 MG/DL (ref 65–100)
HCT VFR BLD AUTO: 37.7 % (ref 35–47)
HGB BLD-MCNC: 12.1 G/DL (ref 11.5–16)
IMM GRANULOCYTES # BLD: 0.1 K/UL (ref 0–0.04)
IMM GRANULOCYTES NFR BLD AUTO: 1 % (ref 0–0.5)
LYMPHOCYTES # BLD: 1.5 K/UL (ref 0.8–3.5)
LYMPHOCYTES NFR BLD: 12 % (ref 12–49)
MCH RBC QN AUTO: 30.6 PG (ref 26–34)
MCHC RBC AUTO-ENTMCNC: 32.1 G/DL (ref 30–36.5)
MCV RBC AUTO: 95.2 FL (ref 80–99)
MONOCYTES # BLD: 1.2 K/UL (ref 0–1)
MONOCYTES NFR BLD: 9 % (ref 5–13)
NEUTS SEG # BLD: 10.1 K/UL (ref 1.8–8)
NEUTS SEG NFR BLD: 78 % (ref 32–75)
NRBC # BLD: 0 K/UL (ref 0–0.01)
NRBC BLD-RTO: 0 PER 100 WBC
PLATELET # BLD AUTO: 197 K/UL (ref 150–400)
PMV BLD AUTO: 11 FL (ref 8.9–12.9)
POTASSIUM SERPL-SCNC: 3.9 MMOL/L (ref 3.5–5.1)
RBC # BLD AUTO: 3.96 M/UL (ref 3.8–5.2)
SODIUM SERPL-SCNC: 142 MMOL/L (ref 136–145)
TROPONIN I SERPL-MCNC: <0.05 NG/ML
WBC # BLD AUTO: 12.9 K/UL (ref 3.6–11)

## 2018-11-10 PROCEDURE — 85025 COMPLETE CBC W/AUTO DIFF WBC: CPT

## 2018-11-10 PROCEDURE — 74011250637 HC RX REV CODE- 250/637: Performed by: EMERGENCY MEDICINE

## 2018-11-10 PROCEDURE — 74011000250 HC RX REV CODE- 250: Performed by: EMERGENCY MEDICINE

## 2018-11-10 PROCEDURE — 96360 HYDRATION IV INFUSION INIT: CPT

## 2018-11-10 PROCEDURE — 84484 ASSAY OF TROPONIN QUANT: CPT

## 2018-11-10 PROCEDURE — 83880 ASSAY OF NATRIURETIC PEPTIDE: CPT

## 2018-11-10 PROCEDURE — 93005 ELECTROCARDIOGRAM TRACING: CPT

## 2018-11-10 PROCEDURE — 99284 EMERGENCY DEPT VISIT MOD MDM: CPT

## 2018-11-10 PROCEDURE — 94640 AIRWAY INHALATION TREATMENT: CPT

## 2018-11-10 PROCEDURE — 71046 X-RAY EXAM CHEST 2 VIEWS: CPT

## 2018-11-10 PROCEDURE — 80048 BASIC METABOLIC PNL TOTAL CA: CPT

## 2018-11-10 PROCEDURE — 74011250636 HC RX REV CODE- 250/636: Performed by: EMERGENCY MEDICINE

## 2018-11-10 PROCEDURE — 36415 COLL VENOUS BLD VENIPUNCTURE: CPT

## 2018-11-10 PROCEDURE — 71275 CT ANGIOGRAPHY CHEST: CPT

## 2018-11-10 PROCEDURE — 74011636320 HC RX REV CODE- 636/320: Performed by: RADIOLOGY

## 2018-11-10 RX ORDER — LEVOFLOXACIN 750 MG/1
750 TABLET ORAL
Status: COMPLETED | OUTPATIENT
Start: 2018-11-10 | End: 2018-11-10

## 2018-11-10 RX ORDER — IPRATROPIUM BROMIDE AND ALBUTEROL SULFATE 2.5; .5 MG/3ML; MG/3ML
3 SOLUTION RESPIRATORY (INHALATION)
Status: COMPLETED | OUTPATIENT
Start: 2018-11-10 | End: 2018-11-10

## 2018-11-10 RX ORDER — LEVOFLOXACIN 750 MG/1
750 TABLET ORAL DAILY
Qty: 7 TAB | Refills: 0 | Status: SHIPPED | OUTPATIENT
Start: 2018-11-10 | End: 2018-11-13

## 2018-11-10 RX ORDER — ACETAMINOPHEN 500 MG
1000 TABLET ORAL ONCE
Status: COMPLETED | OUTPATIENT
Start: 2018-11-10 | End: 2018-11-10

## 2018-11-10 RX ADMIN — LEVOFLOXACIN 750 MG: 750 TABLET, FILM COATED ORAL at 20:33

## 2018-11-10 RX ADMIN — IPRATROPIUM BROMIDE AND ALBUTEROL SULFATE 3 ML: .5; 3 SOLUTION RESPIRATORY (INHALATION) at 18:59

## 2018-11-10 RX ADMIN — ACETAMINOPHEN 1000 MG: 500 TABLET ORAL at 20:32

## 2018-11-10 RX ADMIN — SODIUM CHLORIDE 500 ML: 900 INJECTION, SOLUTION INTRAVENOUS at 19:26

## 2018-11-10 RX ADMIN — IOPAMIDOL 100 ML: 755 INJECTION, SOLUTION INTRAVENOUS at 19:57

## 2018-11-10 NOTE — ED NOTES
SOB, productive cough with brown sputum, subjective fevers, sore throat. Recently completed a Z-[facility] course from [facility].  Patient having dyspnea w exertion and is unable to walk around her house without difficulty. 6:09 PM 
I have evaluated the patient as the Provider in Triage. I have reviewed Her vital signs and the triage nurse assessment. I have talked with the patient and any available family and advised that I am the provider in triage and have ordered the appropriate study to initiate their work up based on the clinical presentation during my assessment. I have advised that the patient will be accommodated in the Main ED as soon as possible. I have also requested to contact the triage nurse or myself immediately if the patient experiences any changes in their condition during this brief waiting period.  
Jimena Delgado MD

## 2018-11-11 LAB
ATRIAL RATE: 96 BPM
CALCULATED P AXIS, ECG09: 56 DEGREES
CALCULATED R AXIS, ECG10: 23 DEGREES
CALCULATED T AXIS, ECG11: 62 DEGREES
DIAGNOSIS, 93000: NORMAL
P-R INTERVAL, ECG05: 140 MS
Q-T INTERVAL, ECG07: 364 MS
QRS DURATION, ECG06: 70 MS
QTC CALCULATION (BEZET), ECG08: 459 MS
VENTRICULAR RATE, ECG03: 96 BPM

## 2018-11-11 NOTE — ED NOTES
I have reviewed discharge instructions with the patient. The patient verbalized understanding. Pt confirmed understanding of need for follow up with primary care provider. Pt is not in any current distress and shows no evidence of clinical instability. Pt left ambulatory  Pt family/friends not present. Pt left with all personal belongings. Pt states they are NOT driving. Pt states chief complaint has  improved with treatment provided PT is alert and oriented x 4, Respiratory status is productive cough, Cardiac system is HTN Paperwork given by provider and reviewed with patient, opportunity for questions/clarification given. Patient Vitals for the past 4 hrs: 
 Temp Pulse Resp BP SpO2  
11/10/18 2036 99.5 °F (37.5 °C) 96 21 192/84 93 % 11/10/18 1930    103/70 98 % 11/10/18 1908     (!) 88 % 11/10/18 1900    174/73 98 % 11/10/18 1806 99.7 °F (37.6 °C) 100 24 (!) 220/91 (!) 88 %

## 2018-11-11 NOTE — ED PROVIDER NOTES
'started coughing Wednesday (fine on tues)/ went to pt 1st/ also w/ 'blood'/ then went to brown/ today back to blood/ Im on day 3 of my Z pack but it is niot helping. Now Im even having trouble walking around the house'; pt denies HA, vison changes, diff swallowing, CP, SOB, Abd pain, F/Ch, N/V, D/Cons or other current systemic complaints The history is provided by the patient. Cough This is a new problem. The current episode started more than 2 days ago. The problem has been rapidly improving. The cough is productive of sputum, productive of brown sputum and productive of blood-tinged sputum. Patient reports a subjective fever - was not measured. The fever has been present for 1 - 2 days. Associated symptoms include sore throat and shortness of breath. Pertinent negatives include no chest pain, no chills, no sweats, no weight loss, no eye redness, no ear congestion, no ear pain, no headaches, no rhinorrhea, no myalgias, no wheezing, no nausea, no vomiting and no confusion. She has tried antibiotics for the symptoms. The treatment provided no relief. Smoker: 'quit 20 years ago' Her past medical history is significant for bronchitis. Her past medical history does not include pneumonia, bronchiectasis, COPD, emphysema, asthma, cancer, heart failure or CHF. Past medical history comments: Anxiety, DM, chronic pain. Past Medical History:  
Diagnosis Date  Anxiety and depression   
 anxiety/depression  Arthritis   
 osteo  Chronic pain   
 knee pain,jointpain,muscle pain, Back pain  DM (diabetes mellitus) ([address]  Kidney mass   
 mass on kidney  Nausea & vomiting  Sleep apnea Past Surgical History:  
Procedure Laterality Date  BREAST SURGERY PROCEDURE UNLISTED    
 breast biopsy  HX OTHER SURGICAL    
 left foot surgery. 18srews, 3plates Family History:  
Problem Relation Age of Onset  Heart Disease Mother  Stroke Mother  Hypertension Father  Diabetes Sister Social History Socioeconomic History  Marital status: SINGLE Spouse name: Not on file  Number of children: Not on file  Years of education: Not on file  Highest education level: Not on file Social Needs  Financial resource strain: Not on file  Food insecurity - worry: Not on file  Food insecurity - inability: Not on file  Transportation needs - medical: Not on file  Transportation needs - non-medical: Not on file Occupational History  Not on file Tobacco Use  Smoking status: Former Smoker Packs/day: 2.00 Years: 25.00 Pack years: 50.00 Last attempt to quit: 11/22/2002 Years since quitting: 15.9  Smokeless tobacco: Never Used Substance and Sexual Activity  Alcohol use: No  
  Alcohol/week: 0.6 oz Types: 1 Cans of beer per week  Drug use: No  
 Sexual activity: No  
Other Topics Concern  Not on file Social History Narrative  Not on file ALLERGIES: Byetta [exenatide] and Metformin Review of Systems Constitutional: Positive for fatigue and fever. Negative for appetite change, chills, diaphoresis, unexpected weight change and weight loss. HENT: Positive for sore throat. Negative for ear pain, rhinorrhea, trouble swallowing and voice change. Eyes: Negative for redness. Respiratory: Positive for cough and shortness of breath. Negative for chest tightness and wheezing. Cardiovascular: Negative for chest pain, palpitations and leg swelling. Gastrointestinal: Negative for abdominal pain, diarrhea, nausea and vomiting. Genitourinary: Negative for dysuria. Musculoskeletal: Negative for myalgias. Neurological: Negative for seizures, speech difficulty, weakness and headaches. Psychiatric/Behavioral: Negative for confusion. All other systems reviewed and are negative. Vitals:  
 11/10/18 1806 11/10/18 1908 BP: (!) 220/91 Pulse: 100 Resp: 24 Temp: 99.7 °F (37.6 °C) SpO2: (!) 88% (!) 88% Weight: 102.5 kg (226 lb) Height: 5' 6\" (1.676 m) Physical Exam  
Constitutional: She is oriented to person, place, and time. She appears well-developed and well-nourished. No distress. M obese, NAD, AxOx4, speaking in complete sentences HENT:  
Head: Normocephalic and atraumatic. Right Ear: External ear normal.  
Left Ear: External ear normal.  
Nose: Nose normal.  
Mouth/Throat: Oropharynx is clear and moist. No oropharyngeal exudate. Eyes: Conjunctivae and EOM are normal. Pupils are equal, round, and reactive to light. Right eye exhibits no discharge. Left eye exhibits no discharge. No scleral icterus. Neck: Normal range of motion. Neck supple. No JVD present. No tracheal deviation present. Cardiovascular: Normal rate, regular rhythm, normal heart sounds and intact distal pulses. Exam reveals no gallop and no friction rub. No murmur heard. Pulmonary/Chest: Effort normal and breath sounds normal. No stridor. No respiratory distress. She has no wheezes. She has no rales. She exhibits no tenderness. Abdominal: Soft. Bowel sounds are normal. There is no tenderness. There is no rebound and no guarding. nttp Genitourinary: No vaginal discharge found. Genitourinary Comments: Pt denies urinary/ vaginal complaints Musculoskeletal: Normal range of motion. She exhibits no edema, tenderness or deformity. Neurological: She is alert and oriented to person, place, and time. No cranial nerve deficit. She exhibits normal muscle tone. Coordination normal.  
pt has motor/ CV/ Sensation grossly intact to all extremities, R = L in strength;  
Skin: Skin is warm and dry. Capillary refill takes 2 to 3 seconds. No rash noted. No erythema. No pallor. Psychiatric: She has a normal mood and affect. Her behavior is normal. Thought content normal.  
Nursing note and vitals reviewed. MDM Procedures Chief Complaint Patient presents with  Shortness of Breath  
 
 
7:51 PM 
The patients presenting problems have been discussed, and they are in agreement with the care plan formulated and outlined with them. I have encouraged them to ask questions as they arise throughout their visit. MEDICATIONS GIVEN: 
Medications  
sodium chloride 0.9 % bolus infusion 500 mL (500 mL IntraVENous New Bag 11/10/18 1926) iopamidol (ISOVUE-370) 76 % injection 100 mL (not administered)  
albuterol-ipratropium (DUO-NEB) 2.5 MG-0.5 MG/3 ML (3 mL Nebulization Given 11/10/18 1799) LABS REVIEWED: 
Labs Reviewed CBC WITH AUTOMATED DIFF - Abnormal; Notable for the following components:  
    Result Value WBC 12.9 (*) NEUTROPHILS 78 (*) IMMATURE GRANULOCYTES 1 (*)   
 ABS. NEUTROPHILS 10.1 (*)   
 ABS. MONOCYTES 1.2 (*)   
 ABS. IMM. GRANS. 0.1 (*) All other components within normal limits METABOLIC PANEL, BASIC - Abnormal; Notable for the following components:  
 Glucose 219 (*) Calcium 8.2 (*) All other components within normal limits NT-PRO BNP  
TROPONIN I  
 
 
RADIOLOGY RESULTS: 
The following have been ordered and reviewed: 
_____________________________________________________________________ 
_____________________________________________________________________ EKG interpretation:  
Rhythm: normal sinus rhythm; and regular . Rate (approx.): 96; Axis: normal; P wave: normal; QRS interval: normal ; ST/T wave: normal; Negative acute significant segmental elevations/ unchanged compared to study dated 02/17/2017 save increased rate;  
 
PROCEDURES: 
 
 
 
CONSULTATIONS:  
 
 
PROGRESS NOTES: 
 
DIAGNOSIS: 
 
1. Hypoxia 2. Hemoptysis PLAN: 
1- CAP - will change rx to Levaquin/ offered/ refused admission/ 'will use my CPAP'; agrees w/ plans; pulm f/up;  
 
 
ED COURSE: The patients hospital course has been uncomplicated.  
 
 
 8:26 PM 'feeling better now'; discussed hypoxia/ 'Cathie had this before/ saw a pulmonologist and everything was OK'; Offered/ refused admission/ agrees w/ plans  Karyn Albrecht  results have been reviewed with her. She has been counseled regarding her diagnosis. She verbally conveys understanding and agreement of the signs, symptoms, diagnosis, treatment and prognosis and additionally agrees to Call/ Arrange follow up as recommended with Dr. Wilfrid Myers MD in 24 - 48 hours. She also agrees with the care-plan and conveys that all of her questions have been answered. I have also put together some discharge instructions for her that include: 1) educational information regarding their diagnosis, 2) how to care for their diagnosis at home, as well a 3) list of reasons why they would want to return to the ED prior to their follow-up appointment, should their condition change or for concerns.

## 2018-11-11 NOTE — DISCHARGE INSTRUCTIONS
Pneumonia: Care Instructions  Your Care Instructions    Pneumonia is an infection of the lungs. Most cases are caused by infections from bacteria or viruses. Pneumonia may be mild or very severe. If it is caused by bacteria, you will be treated with antibiotics. It may take a few weeks to a few months to recover fully from pneumonia, depending on how sick you were and whether your overall health is good. Follow-up care is a key part of your treatment and safety. Be sure to make and go to all appointments, and call your doctor if you are having problems. It's also a good idea to know your test results and keep a list of the medicines you take. How can you care for yourself at home? · Take your antibiotics exactly as directed. Do not stop taking the medicine just because you are feeling better. You need to take the full course of antibiotics. · Take your medicines exactly as prescribed. Call your doctor if you think you are having a problem with your medicine. · Get plenty of rest and sleep. You may feel weak and tired for a while, but your energy level will improve with time. · To prevent dehydration, drink plenty of fluids, enough so that your urine is light yellow or clear like water. Choose water and other caffeine-free clear liquids until you feel better. If you have kidney, heart, or liver disease and have to limit fluids, talk with your doctor before you increase the amount of fluids you drink. · Take care of your cough so you can rest. A cough that brings up mucus from your lungs is common with pneumonia. It is one way your body gets rid of the infection. But if coughing keeps you from resting or causes severe fatigue and chest-wall pain, talk to your doctor. He or she may suggest that you take a medicine to reduce the cough. · Use a vaporizer or humidifier to add moisture to your bedroom. Follow the directions for cleaning the machine. · Do not smoke or allow others to smoke around you.  Smoke will make your cough last longer. If you need help quitting, talk to your doctor about stop-smoking programs and medicines. These can increase your chances of quitting for good. · Take an over-the-counter pain medicine, such as acetaminophen (Tylenol), ibuprofen (Advil, Motrin), or naproxen (Aleve). Read and follow all instructions on the label. · Do not take two or more pain medicines at the same time unless the doctor told you to. Many pain medicines have acetaminophen, which is Tylenol. Too much acetaminophen (Tylenol) can be harmful. · If you were given a spirometer to measure how well your lungs are working, use it as instructed. This can help your doctor tell how your recovery is going. · To prevent pneumonia in the future, talk to your doctor about getting a flu vaccine (once a year) and a pneumococcal vaccine (one time only for most people). When should you call for help? Call 911 anytime you think you may need emergency care. For example, call if:    · You have severe trouble breathing.    Call your doctor now or seek immediate medical care if:    · You cough up dark brown or bloody mucus (sputum).     · You have new or worse trouble breathing.     · You are dizzy or lightheaded, or you feel like you may faint.    Watch closely for changes in your health, and be sure to contact your doctor if:    · You have a new or higher fever.     · You are coughing more deeply or more often.     · You are not getting better after 2 days (48 hours).     · You do not get better as expected. Where can you learn more? Go to http://mark-nicole.info/. Enter [id number] in the search box to learn more about \"Pneumonia: Care Instructions. \"  Current as of: December 6, 2017  Content Version: 11.8  © 0054-4603 Healthwise, [employer]. Care instructions adapted under license by [employer] (which disclaims liability or warranty for this information).  If you have questions about a medical condition or this instruction, always ask your healthcare professional. [address] any warranty or liability for your use of this information. Learning About Hypoxemia  What is hypoxemia? Hypoxemia means that you don't have enough oxygen in your blood. It's a result of diseases that affect your heart or lungs. These include heart failure, COPD, and pulmonary fibrosis (scarring of the lungs). Being at high altitudes can also lead to hypoxemia. What happens when you have hypoxemia? Oxygen gets into your blood through your lungs. Your blood carries the oxygen to all parts of your body. When you have too little oxygen in your blood, your body doesn't get enough of it. With too little oxygen, your heart and other parts of your body don't work very well. What are the symptoms? In addition to the symptoms of whatever is causing your hypoxemia, you may:  · Get tired quickly. · Be short of breath when you are active. · Feel like your heart is pounding or racing. · Feel weak or dizzy. · Become confused. How is hypoxemia treated? Your doctor will do tests to find out how much oxygen is in your blood. He or she will look for the cause of your hypoxemia and treat that problem. For example, if you have heart failure, you may need medicines that help your heart pump better. · If your hypoxemia is not severe, your doctor may give you oxygen through a mask or nasal cannula (say \"MARIELA-yuh-randy\"). A cannula is a thin tube with two openings that fit just inside your nose. · If your hypoxemia is severe, you may have a breathing tube put into your windpipe. The breathing tube is attached to a machine that pushes air into your lungs. This machine is called a ventilator. · If you have a long-term problem with hypoxemia, your doctor may recommend that you use oxygen regularly. Some people need it all the time. Others need it from time to time throughout the day or overnight.  Your doctor will tell you how much oxygen you need and how often to use it. Follow-up care is a key part of your treatment and safety. Be sure to make and go to all appointments, and call your doctor if you are having problems. It's also a good idea to know your test results and keep a list of the medicines you take. Where can you learn more? Go to http://mark-nicole.info/. Enter M375 in the search box to learn more about \"Learning About Hypoxemia. \"  Current as of: December 6, 2017  Content Version: 11.8  © 6165-1527 [employer]. Care instructions adapted under license by [employer] (which disclaims liability or warranty for this information). If you have questions about a medical condition or this instruction, always ask your healthcare professional. [place] [address] any warranty or liability for your use of this information. Coughing Up Blood: Care Instructions  Your Care Instructions    Coughing up blood can be frightening. The blood may come from the lungs, stomach, or throat. You may cough up a few thin streaks of bright red blood. This probably is not a cause for concern. Coughing up large amounts of bright red blood or rust-colored mucus from the lungs can be a symptom of a more serious condition. Several conditions can make you cough up blood from the lungs. These include bronchitis and pneumonia, or more serious problems such as cancer or a blood clot in the lung (pulmonary embolus). Depending on what is causing your cough, it may go away after the illness is treated. Your doctor may tell you not to suppress the cough with cough medicine if it is better for you to cough up the blood and spit it out. Follow-up care is a key part of your treatment and safety. Be sure to make and go to all appointments, and call your doctor if you are having problems. It's also a good idea to know your test results and keep a list of the medicines you take.   How can you care for yourself at home? · Make a note of when and for how long you cough up blood. Also note if you are coughing up spit with a small amount of blood, or mostly blood. Take this information to your next appointment with your doctor. · Increase your fluid intake to at least 8 to 10 glasses of water every day. This helps keep the mucus thin and helps you cough it up. If you have kidney, heart, or liver disease and have to limit fluids, talk with your doctor before you increase your fluid intake. · If your doctor prescribed antibiotics, take them as directed. Do not stop taking them just because you feel better. You need to take the full course of antibiotics. · Do not take cough medicine without your doctor's guidance. They can cause problems if you have other health problems. They can also interact with other medicine. · Do not smoke or use other forms of tobacco, especially while you have a cough. Smoking can make coughing worse. If you need help quitting, talk to your doctor about stop-smoking programs and medicines. These can increase your chances of quitting for good. · Avoid exposure to smoke, dust, or other pollutants. When should you call for help? Call 911 anytime you think you may need emergency care. For example, call if:    · You have sudden chest pain and shortness of breath.     · You have severe trouble breathing.    Call your doctor now or seek immediate medical care if:    · You have wheezing and difficulty breathing.     · You are dizzy or lightheaded, or you feel like you may faint.     · You cough up clots of blood.    Watch closely for changes in your health, and be sure to contact your doctor if:    · You do not get better as expected.     · You have any new symptoms, such as chest pain with difficulty breathing or a fever. Where can you learn more? Go to http://mark-nicole.info/.   Enter M550 in the search box to learn more about \"Coughing Up Blood: Care Instructions. \"  Current as of: November 20, 2017  Content Version: 11.8  © 5957-1830 Healthwise, [facility]. Care instructions adapted under license by [employer] (which disclaims liability or warranty for this information). If you have questions about a medical condition or this instruction, always ask your healthcare professional. [place] [address] any warranty or liability for your use of this information.

## 2018-11-13 ENCOUNTER — APPOINTMENT (OUTPATIENT)
Dept: CT IMAGING | Age: 73
DRG: 194 | End: 2018-11-13
Attending: EMERGENCY MEDICINE
Payer: MEDICARE

## 2018-11-13 ENCOUNTER — APPOINTMENT (OUTPATIENT)
Dept: GENERAL RADIOLOGY | Age: 73
DRG: 194 | End: 2018-11-13
Attending: EMERGENCY MEDICINE
Payer: MEDICARE

## 2018-11-13 ENCOUNTER — HOSPITAL ENCOUNTER (INPATIENT)
Age: 73
LOS: 2 days | Discharge: HOME | DRG: 194 | End: 2018-11-15
Attending: STUDENT IN AN ORGANIZED HEALTH CARE EDUCATION/TRAINING PROGRAM | Admitting: INTERNAL MEDICINE
Payer: MEDICARE

## 2018-11-13 DIAGNOSIS — R06.02 SOB (SHORTNESS OF BREATH): Primary | ICD-10-CM

## 2018-11-13 DIAGNOSIS — J18.9 PNEUMONIA OF BOTH UPPER LOBES DUE TO INFECTIOUS ORGANISM: ICD-10-CM

## 2018-11-13 PROBLEM — F41.8 ANXIETY WITH DEPRESSION: Status: ACTIVE | Noted: 2018-11-13

## 2018-11-13 LAB
ANION GAP SERPL CALC-SCNC: 6 MMOL/L (ref 5–15)
ATRIAL RATE: 82 BPM
B PERT DNA SPEC QL NAA+PROBE: NOT DETECTED
BASOPHILS # BLD: 0 K/UL (ref 0–0.1)
BASOPHILS NFR BLD: 0 % (ref 0–1)
BUN SERPL-MCNC: 15 MG/DL (ref 6–20)
BUN/CREAT SERPL: 19 (ref 12–20)
C PNEUM DNA SPEC QL NAA+PROBE: NOT DETECTED
CALCIUM SERPL-MCNC: 8.5 MG/DL (ref 8.5–10.1)
CALCULATED P AXIS, ECG09: 45 DEGREES
CALCULATED R AXIS, ECG10: 20 DEGREES
CALCULATED T AXIS, ECG11: 52 DEGREES
CHLORIDE SERPL-SCNC: 105 MMOL/L (ref 97–108)
CO2 SERPL-SCNC: 31 MMOL/L (ref 21–32)
CREAT SERPL-MCNC: 0.81 MG/DL (ref 0.55–1.02)
DIAGNOSIS, 93000: NORMAL
DIFFERENTIAL METHOD BLD: ABNORMAL
EOSINOPHIL # BLD: 0.3 K/UL (ref 0–0.4)
EOSINOPHIL NFR BLD: 2 % (ref 0–7)
ERYTHROCYTE [DISTWIDTH] IN BLOOD BY AUTOMATED COUNT: 12.6 % (ref 11.5–14.5)
EST. AVERAGE GLUCOSE BLD GHB EST-MCNC: 189 MG/DL
FLUAV H1 2009 PAND RNA SPEC QL NAA+PROBE: NOT DETECTED
FLUAV H1 RNA SPEC QL NAA+PROBE: NOT DETECTED
FLUAV H3 RNA SPEC QL NAA+PROBE: NOT DETECTED
FLUAV SUBTYP SPEC NAA+PROBE: NOT DETECTED
FLUBV RNA SPEC QL NAA+PROBE: NOT DETECTED
GLUCOSE BLD STRIP.AUTO-MCNC: 123 MG/DL (ref 65–100)
GLUCOSE BLD STRIP.AUTO-MCNC: 216 MG/DL (ref 65–100)
GLUCOSE SERPL-MCNC: 199 MG/DL (ref 65–100)
HADV DNA SPEC QL NAA+PROBE: NOT DETECTED
HBA1C MFR BLD: 8.2 % (ref 4.2–6.3)
HCOV 229E RNA SPEC QL NAA+PROBE: NOT DETECTED
HCOV HKU1 RNA SPEC QL NAA+PROBE: NOT DETECTED
HCOV NL63 RNA SPEC QL NAA+PROBE: NOT DETECTED
HCOV OC43 RNA SPEC QL NAA+PROBE: NOT DETECTED
HCT VFR BLD AUTO: 37.6 % (ref 35–47)
HGB BLD-MCNC: 11.8 G/DL (ref 11.5–16)
HMPV RNA SPEC QL NAA+PROBE: NOT DETECTED
HPIV1 RNA SPEC QL NAA+PROBE: NOT DETECTED
HPIV2 RNA SPEC QL NAA+PROBE: NOT DETECTED
HPIV3 RNA SPEC QL NAA+PROBE: NOT DETECTED
HPIV4 RNA SPEC QL NAA+PROBE: NOT DETECTED
IMM GRANULOCYTES # BLD: 0.1 K/UL (ref 0–0.04)
IMM GRANULOCYTES NFR BLD AUTO: 1 % (ref 0–0.5)
LACTATE SERPL-SCNC: 1.4 MMOL/L (ref 0.4–2)
LYMPHOCYTES # BLD: 1.7 K/UL (ref 0.8–3.5)
LYMPHOCYTES NFR BLD: 14 % (ref 12–49)
M PNEUMO DNA SPEC QL NAA+PROBE: NOT DETECTED
MCH RBC QN AUTO: 30.2 PG (ref 26–34)
MCHC RBC AUTO-ENTMCNC: 31.4 G/DL (ref 30–36.5)
MCV RBC AUTO: 96.2 FL (ref 80–99)
MONOCYTES # BLD: 1 K/UL (ref 0–1)
MONOCYTES NFR BLD: 9 % (ref 5–13)
NEUTS SEG # BLD: 8.6 K/UL (ref 1.8–8)
NEUTS SEG NFR BLD: 74 % (ref 32–75)
NRBC # BLD: 0 K/UL (ref 0–0.01)
NRBC BLD-RTO: 0 PER 100 WBC
P-R INTERVAL, ECG05: 140 MS
PLATELET # BLD AUTO: 233 K/UL (ref 150–400)
PMV BLD AUTO: 10.7 FL (ref 8.9–12.9)
POTASSIUM SERPL-SCNC: 3.8 MMOL/L (ref 3.5–5.1)
Q-T INTERVAL, ECG07: 388 MS
QRS DURATION, ECG06: 72 MS
QTC CALCULATION (BEZET), ECG08: 453 MS
RBC # BLD AUTO: 3.91 M/UL (ref 3.8–5.2)
RSV RNA SPEC QL NAA+PROBE: NOT DETECTED
RV+EV RNA SPEC QL NAA+PROBE: NOT DETECTED
SERVICE CMNT-IMP: ABNORMAL
SERVICE CMNT-IMP: ABNORMAL
SODIUM SERPL-SCNC: 142 MMOL/L (ref 136–145)
TROPONIN I SERPL-MCNC: <0.05 NG/ML
VENTRICULAR RATE, ECG03: 82 BPM
WBC # BLD AUTO: 11.7 K/UL (ref 3.6–11)

## 2018-11-13 PROCEDURE — 74011000250 HC RX REV CODE- 250: Performed by: EMERGENCY MEDICINE

## 2018-11-13 PROCEDURE — 87040 BLOOD CULTURE FOR BACTERIA: CPT

## 2018-11-13 PROCEDURE — 83036 HEMOGLOBIN GLYCOSYLATED A1C: CPT

## 2018-11-13 PROCEDURE — 74011636637 HC RX REV CODE- 636/637: Performed by: INTERNAL MEDICINE

## 2018-11-13 PROCEDURE — 93005 ELECTROCARDIOGRAM TRACING: CPT

## 2018-11-13 PROCEDURE — 65660000000 HC RM CCU STEPDOWN

## 2018-11-13 PROCEDURE — 74011000250 HC RX REV CODE- 250: Performed by: INTERNAL MEDICINE

## 2018-11-13 PROCEDURE — 77030029684 HC NEB SM VOL KT MONA -A

## 2018-11-13 PROCEDURE — 80048 BASIC METABOLIC PNL TOTAL CA: CPT

## 2018-11-13 PROCEDURE — 99285 EMERGENCY DEPT VISIT HI MDM: CPT

## 2018-11-13 PROCEDURE — 77030013140 HC MSK NEB VYRM -A

## 2018-11-13 PROCEDURE — 77030018846 HC SOL IRR STRL H20 ICUM -A

## 2018-11-13 PROCEDURE — 74011636320 HC RX REV CODE- 636/320: Performed by: RADIOLOGY

## 2018-11-13 PROCEDURE — 82962 GLUCOSE BLOOD TEST: CPT

## 2018-11-13 PROCEDURE — 74011250636 HC RX REV CODE- 250/636: Performed by: EMERGENCY MEDICINE

## 2018-11-13 PROCEDURE — 94640 AIRWAY INHALATION TREATMENT: CPT

## 2018-11-13 PROCEDURE — 94760 N-INVAS EAR/PLS OXIMETRY 1: CPT

## 2018-11-13 PROCEDURE — 87798 DETECT AGENT NOS DNA AMP: CPT

## 2018-11-13 PROCEDURE — 65270000029 HC RM PRIVATE

## 2018-11-13 PROCEDURE — 71275 CT ANGIOGRAPHY CHEST: CPT

## 2018-11-13 PROCEDURE — 85025 COMPLETE CBC W/AUTO DIFF WBC: CPT

## 2018-11-13 PROCEDURE — 83605 ASSAY OF LACTIC ACID: CPT

## 2018-11-13 PROCEDURE — 71046 X-RAY EXAM CHEST 2 VIEWS: CPT

## 2018-11-13 PROCEDURE — 84484 ASSAY OF TROPONIN QUANT: CPT

## 2018-11-13 PROCEDURE — 77010033678 HC OXYGEN DAILY

## 2018-11-13 PROCEDURE — 36415 COLL VENOUS BLD VENIPUNCTURE: CPT

## 2018-11-13 RX ORDER — GABAPENTIN 100 MG/1
100 CAPSULE ORAL DAILY
COMMUNITY
End: 2020-07-27

## 2018-11-13 RX ORDER — LISINOPRIL 20 MG/1
40 TABLET ORAL DAILY
COMMUNITY
End: 2020-07-27

## 2018-11-13 RX ORDER — INSULIN LISPRO 100 [IU]/ML
INJECTION, SOLUTION INTRAVENOUS; SUBCUTANEOUS
Status: DISCONTINUED | OUTPATIENT
Start: 2018-11-13 | End: 2018-11-15 | Stop reason: HOSPADM

## 2018-11-13 RX ORDER — ENOXAPARIN SODIUM 100 MG/ML
40 INJECTION SUBCUTANEOUS EVERY 12 HOURS
Status: DISCONTINUED | OUTPATIENT
Start: 2018-11-13 | End: 2018-11-15 | Stop reason: HOSPADM

## 2018-11-13 RX ORDER — LISINOPRIL 20 MG/1
20 TABLET ORAL DAILY
Status: DISCONTINUED | OUTPATIENT
Start: 2018-11-14 | End: 2018-11-15 | Stop reason: HOSPADM

## 2018-11-13 RX ORDER — ACETAMINOPHEN 325 MG/1
650 TABLET ORAL
Status: DISCONTINUED | OUTPATIENT
Start: 2018-11-13 | End: 2018-11-15 | Stop reason: HOSPADM

## 2018-11-13 RX ORDER — ALBUTEROL SULFATE 0.83 MG/ML
5 SOLUTION RESPIRATORY (INHALATION)
Status: COMPLETED | OUTPATIENT
Start: 2018-11-13 | End: 2018-11-13

## 2018-11-13 RX ORDER — ONDANSETRON 2 MG/ML
4 INJECTION INTRAMUSCULAR; INTRAVENOUS
Status: DISCONTINUED | OUTPATIENT
Start: 2018-11-13 | End: 2018-11-15 | Stop reason: HOSPADM

## 2018-11-13 RX ORDER — DEXTROSE 50 % IN WATER (D50W) INTRAVENOUS SYRINGE
12.5-25 AS NEEDED
Status: DISCONTINUED | OUTPATIENT
Start: 2018-11-13 | End: 2018-11-15 | Stop reason: HOSPADM

## 2018-11-13 RX ORDER — AMOXICILLIN AND CLAVULANATE POTASSIUM 875; 125 MG/1; MG/1
1 TABLET, FILM COATED ORAL EVERY 12 HOURS
COMMUNITY
Start: 2018-11-12 | End: 2018-11-21

## 2018-11-13 RX ORDER — AZITHROMYCIN 250 MG/1
500 TABLET, FILM COATED ORAL
Status: DISCONTINUED | OUTPATIENT
Start: 2018-11-13 | End: 2018-11-13

## 2018-11-13 RX ORDER — MAGNESIUM SULFATE 100 %
4 CRYSTALS MISCELLANEOUS AS NEEDED
Status: DISCONTINUED | OUTPATIENT
Start: 2018-11-13 | End: 2018-11-15 | Stop reason: HOSPADM

## 2018-11-13 RX ORDER — ENOXAPARIN SODIUM 100 MG/ML
40 INJECTION SUBCUTANEOUS EVERY 24 HOURS
Status: DISCONTINUED | OUTPATIENT
Start: 2018-11-13 | End: 2018-11-13

## 2018-11-13 RX ORDER — INSULIN LISPRO 100 [IU]/ML
10-15 INJECTION, SOLUTION INTRAVENOUS; SUBCUTANEOUS
COMMUNITY

## 2018-11-13 RX ORDER — INSULIN GLARGINE 100 [IU]/ML
30 INJECTION, SOLUTION SUBCUTANEOUS
Status: DISCONTINUED | OUTPATIENT
Start: 2018-11-13 | End: 2018-11-15 | Stop reason: HOSPADM

## 2018-11-13 RX ORDER — SODIUM CHLORIDE 0.9 % (FLUSH) 0.9 %
5-10 SYRINGE (ML) INJECTION AS NEEDED
Status: DISCONTINUED | OUTPATIENT
Start: 2018-11-13 | End: 2018-11-15 | Stop reason: HOSPADM

## 2018-11-13 RX ORDER — NALOXONE HYDROCHLORIDE 0.4 MG/ML
0.4 INJECTION, SOLUTION INTRAMUSCULAR; INTRAVENOUS; SUBCUTANEOUS AS NEEDED
Status: DISCONTINUED | OUTPATIENT
Start: 2018-11-13 | End: 2018-11-15 | Stop reason: HOSPADM

## 2018-11-13 RX ORDER — ACETAMINOPHEN 500 MG
1000 TABLET ORAL
COMMUNITY
End: 2021-02-11 | Stop reason: ALTCHOICE

## 2018-11-13 RX ORDER — SODIUM CHLORIDE 0.9 % (FLUSH) 0.9 %
5-10 SYRINGE (ML) INJECTION EVERY 8 HOURS
Status: DISCONTINUED | OUTPATIENT
Start: 2018-11-13 | End: 2018-11-15 | Stop reason: HOSPADM

## 2018-11-13 RX ORDER — GABAPENTIN 100 MG/1
100 CAPSULE ORAL DAILY
Status: DISCONTINUED | OUTPATIENT
Start: 2018-11-14 | End: 2018-11-15 | Stop reason: HOSPADM

## 2018-11-13 RX ORDER — MORPHINE SULFATE 4 MG/ML
2 INJECTION INTRAVENOUS
Status: DISCONTINUED | OUTPATIENT
Start: 2018-11-13 | End: 2018-11-15 | Stop reason: HOSPADM

## 2018-11-13 RX ORDER — DIPHENHYDRAMINE HYDROCHLORIDE 50 MG/ML
12.5 INJECTION, SOLUTION INTRAMUSCULAR; INTRAVENOUS
Status: DISCONTINUED | OUTPATIENT
Start: 2018-11-13 | End: 2018-11-15 | Stop reason: HOSPADM

## 2018-11-13 RX ORDER — ASPIRIN 81 MG/1
81 TABLET ORAL DAILY
Status: DISCONTINUED | OUTPATIENT
Start: 2018-11-14 | End: 2018-11-15 | Stop reason: HOSPADM

## 2018-11-13 RX ORDER — ALPRAZOLAM 0.5 MG/1
0.5 TABLET ORAL
Status: DISCONTINUED | OUTPATIENT
Start: 2018-11-13 | End: 2018-11-15 | Stop reason: HOSPADM

## 2018-11-13 RX ORDER — IPRATROPIUM BROMIDE AND ALBUTEROL SULFATE 2.5; .5 MG/3ML; MG/3ML
3 SOLUTION RESPIRATORY (INHALATION)
Status: DISCONTINUED | OUTPATIENT
Start: 2018-11-13 | End: 2018-11-14

## 2018-11-13 RX ADMIN — Medication 10 ML: at 21:46

## 2018-11-13 RX ADMIN — IOPAMIDOL 80 ML: 755 INJECTION, SOLUTION INTRAVENOUS at 11:10

## 2018-11-13 RX ADMIN — ALBUTEROL SULFATE 5 MG: 2.5 SOLUTION RESPIRATORY (INHALATION) at 08:59

## 2018-11-13 RX ADMIN — CEFTRIAXONE SODIUM 1 G: 1 INJECTION, POWDER, FOR SOLUTION INTRAMUSCULAR; INTRAVENOUS at 12:15

## 2018-11-13 RX ADMIN — INSULIN LISPRO 3 UNITS: 100 INJECTION, SOLUTION INTRAVENOUS; SUBCUTANEOUS at 16:30

## 2018-11-13 RX ADMIN — AZITHROMYCIN MONOHYDRATE 500 MG: 500 INJECTION, POWDER, LYOPHILIZED, FOR SOLUTION INTRAVENOUS at 12:25

## 2018-11-13 RX ADMIN — IPRATROPIUM BROMIDE AND ALBUTEROL SULFATE 3 ML: .5; 3 SOLUTION RESPIRATORY (INHALATION) at 18:21

## 2018-11-13 NOTE — PROGRESS NOTES
[facility] Dosing Services:  
 
Consult for Enoxaparin Dosing by Dr. Obey Vasquez Enoxaparin Indication:  DVT PPX Previous Dose Lovenox 40 mg SQ daily Serum Creatinine Lab Results Component Value Date/Time Creatinine 0.81 11/13/2018 09:13 AM  
  
Creatinine Clearance Estimated Creatinine Clearance: 76.8 mL/min (based on SCr of 0.81 mg/dL). Platelets Lab Results Component Value Date/Time PLATELET 352 11/35/9843 09:13 AM  
   
H/H Lab Results Component Value Date/Time HGB 11.8 11/13/2018 09:13 AM  
  
 
Adjustments:  lovenox 40 mg SQ BID for BMI > 40 (41.2) Continue to monitor Signed Edwyna Sheets, PHARMD

## 2018-11-13 NOTE — ED NOTES
TRANSFER - OUT REPORT: 
 
Verbal report given to [facility] (name[facility] on Tamara Aponte  being transferred to [facility] (unit) for routine progression of care Report consisted of patients Situation, Background, Assessment and  
Recommendations(SBAR). Information from the following report(s) SBAR, ED Summary, Intake/Output, MAR and Recent Results was reviewed with the receiving nurse. Lines:  
Peripheral IV 11/13/18 Right Hand (Active) Site Assessment Clean, dry, & intact 11/13/2018  1:19 PM  
Phlebitis Assessment 0 11/13/2018  1:19 PM  
Infiltration Assessment 0 11/13/2018  1:19 PM  
Dressing Status Clean, dry, & intact 11/13/2018  1:19 PM  
Hub Color/Line Status Pink 11/13/2018  1:19 PM  
  
 
Opportunity for questions and clarification was provided. Patient transported with: 
 Monitor Registered Nurse

## 2018-11-13 NOTE — PROGRESS NOTES
11/13/2018 
1:47 PM 
Case management note Met with patient to discuss discharge planning. Confirmed demographics. Patient lives alone, drives and has not steps to enter dewelling. Patient uses a CPAP @ 12cm. , unable to recall name of company. Patient uses [employer] [facility] [address] for [employer] needs. Patient is independent with ADL's. Dr. Bryson Franco for medical management. No Nn 
No advance directive. Reason for Admission:   SOB 
               
RRAT Score:     12 Do you (patient/family) have any concerns for transition/discharge? Lives alone Plan for utilizing home health: Would benefit from hh for skilled nursing for disease management, education and PT/OT for debility and strengthen. Likelihood of readmission? Moderate/yellow Transition of Care Plan:    Home with hh, outpatient follow up, pulmonary rehab Care Management Interventions PCP Verified by CM: Yes(dr. bessy freitas no nn) Mode of Transport at Discharge: Self Transition of Care Consult (CM Consult): Discharge Planning Current Support Network: Lives with Spouse, Lives Alone Confirm Follow Up Transport: Family Plan discussed with Pt/Family/Caregiver: Yes Discharge Location Discharge Placement: Unable to determine at this time Huang Martinez, [place] Lopes

## 2018-11-13 NOTE — ED PROVIDER NOTES
81-year-old female presents to the emergency room for evaluation of shortness of breath. Patient states one week ago she began to get ill. She had developed congestion runny nose and was coughing up blood. Patient states she was seen in this emergency room and was diagnosed with pneumonia. Patient was started on Levaquin. Patient took 2 doses of Levaquin but became concerned about potential side effects. Patient was changed to Augmentin by her primary care doctor. Patient has taken 3 doses of Augmentin. She states she is not feeling better. She continues with chest tightness productive cough shortness of breath. Mild nausea this morning but no vomiting. She is able to eat and drink. She reports subjective fever last night. No headache dizziness or lightheadedness. No abdominal pain or muscle aches. No alleviating factors Old chart was reviewed from the 10th. CT scan of the chest shows no definite pulmonary emboli. Scattered nodular opacities in the upper limits of the long left greater than right and a small focal area in the left lower lobe. Social hx Hx of smoking Occasional alcohol Past Medical History:  
Diagnosis Date  Anxiety and depression   
 anxiety/depression  Arthritis   
 osteo  Chronic pain   
 knee pain,jointpain,muscle pain, Back pain  DM (diabetes mellitus) ([facility] [address]  Kidney mass   
 mass on kidney  Nausea & vomiting  Sleep apnea Past Surgical History:  
Procedure Laterality Date  BREAST SURGERY PROCEDURE UNLISTED    
 breast biopsy  HX OTHER SURGICAL    
 left foot surgery. 18srews, 3plates Family History:  
Problem Relation Age of Onset  Heart Disease Mother  Stroke Mother  Hypertension Father  Diabetes Sister Social History Socioeconomic History  Marital status: SINGLE Spouse name: Not on file  Number of children: Not on file  Years of education: Not on file  Highest education level: Not on file Social Needs  Financial resource strain: Not on file  Food insecurity - worry: Not on file  Food insecurity - inability: Not on file  Transportation needs - medical: Not on file  Transportation needs - non-medical: Not on file Occupational History  Not on file Tobacco Use  Smoking status: Former Smoker Packs/day: 2.00 Years: 25.00 Pack years: 50.00 Last attempt to quit: 11/22/2002 Years since quitting: 15.9  Smokeless tobacco: Never Used Substance and Sexual Activity  Alcohol use: No  
  Alcohol/week: 0.6 oz Types: 1 Cans of beer per week  Drug use: No  
 Sexual activity: No  
Other Topics Concern  Not on file Social History Narrative  Not on file ALLERGIES: Byetta [exenatide] and Metformin Review of Systems Constitutional: Negative for activity change, appetite change, chills and fever (subjective ). HENT: Positive for congestion and rhinorrhea. Negative for tinnitus and trouble swallowing. Respiratory: Positive for cough, chest tightness and shortness of breath. Cardiovascular: Negative for chest pain. Gastrointestinal: Positive for nausea. Negative for abdominal pain and vomiting. Genitourinary: Negative for difficulty urinating, dysuria and urgency. Musculoskeletal: Negative for back pain, neck pain and neck stiffness. Skin: Negative for color change and rash. Neurological: Negative for dizziness, light-headedness and headaches. All other systems reviewed and are negative. Vitals:  
 11/13/18 0825 11/13/18 9699 11/13/18 2609 BP: 179/77 165/75 Pulse: 100 92 Resp: 26 Temp: 98.6 °F (37 °C) SpO2: (!) 89%  94% Weight: 108.9 kg (240 lb) Height: 5' 4\" (1.626 m) Physical Exam  
Constitutional: Well-developed and well-nourished. No distress. HENT:  
Nose: No nasal discharge. Mouth/Throat: Mucous membranes are moist. No dental caries. No tonsillar exudate. Oropharynx is clear. Pharynx is normal.  
Uvula midline, no trismus, drooling, submandibular swelling. Tonsils 2+ bilaterally. No exudates. Tolerating secretions without problem. No mastoid tenderness. Eyes: Conjunctivae are normal. Pupils are equal, round, and reactive to light. Right eye exhibits no discharge. Left eye exhibits no discharge. Neck: Normal range of motion. Neck supple. No neck rigidity. Cardiovascular: Normal rate and regular rhythm. Pulmonary/Chest: ON NASAL CANULA. COARSE SOUNDS BILATERALLY. FEW SCATTERED WHEEZES Effort normal  No stridor. No respiratory distress. She has no rales. Abdominal: Soft. There is no hepatosplenomegaly. There is no rebound and no guarding. No pain with palpation. Musculoskeletal: Normal range of motion. no signs of injury. Lymphadenopathy: No cervical adenopathy. Neurological:  alert. normal muscle tone. Coordination normal.  
Skin: Skin is warm and dry. No petechiae, no purpura and no rash noted. Nursing note and vitals reviewed. MDM Number of Diagnoses or Management Options Diagnosis management comments: 21-year-old female with pneumonia presenting for continuing to feel bad. On arrival room air sats 89%. Patient was on oxygen saturations into the upper 90s. She said she scattered wheezes bilaterally. She speaking full sentences. She is not retracting. She is in no acute distress. Plan: Repeat chest x-ray, labs, albuterol reevaluate Procedures CONSULT NOTE: 
10:28 AM Nahum Zavala MD spoke with Dr. Chato Luevano, Consult for Hospitalist.  Discussed available diagnostic tests and clinical findings. Dr. Chato Luevano will admit the pt.  
 
10:28 AM 
Patient is being admitted to the hospital.  The results of their tests and reasons for their admission have been discussed with them and/or available family.   They convey agreement and understanding for the need to be admitted and for their admission diagnosis. Consultation will be made now with the inpatient physician for hospitalization.

## 2018-11-13 NOTE — PROGRESS NOTES
BSI: MED RECONCILIATION Comments/Recommendations:  
? Patient is awake, alert, and knowledgeable about home medications ? Verifies allergies ? Preferred pharmacy updated. ? The patient has taken multiple antibiotics recently but the courses were not completed as a provider changed her to another antibiotic. The patient was prescribed cephalexin in October. She cannot recall why she was prescribed this antibiotic but she only took it for a few days then it was stopped by a provider but she is unsure of the reason. She also partially completed azithromycin and levofloxacin. The levofloxacin made her ankles swell. ? The patient's blood glucose this morning fasting was 144. At the time of interview it was 119.  
? Pharmacist reviewed prescription refill history with Rx Query Medications added: · Augmentin · Acetaminophen · Humalog Medications removed: · Diclofenac · Hydrocodone/apap · Ibuprofen · Levofloxacin · Polyethylene glycol · Prednisone · Tramadol-acetaminophen · Novolog Medications adjusted: · Alprazolam 
· Gabapentin · Levemir · Lisinopril Allergies: Byetta [exenatide] and Metformin Prior to Admission Medications:  
Prior to Admission Medications Prescriptions Last Dose Informant Patient Reported? Taking? ALPRAZolam (XANAX) 1 mg tablet  Self Yes Yes Sig: Take 0.5 mg by mouth nightly as needed for Sleep.  
acetaminophen (TYLENOL) 500 mg tablet  Self Yes Yes Sig: Take 1,000 mg by mouth two (2) times daily as needed for Pain.  
amoxicillin-clavulanate (AUGMENTIN) 875-125 mg per tablet 11/13/2018 at [address] [place] [address] Road Yes Yes Sig: Take 1 Tab by mouth every twelve (12) hours. X 10 days started 11/12/18 (first dose taken in the evening  
aspirin delayed-release 81 mg tablet 11/13/2018 at 7am Self Yes Yes Sig: Take 81 mg by mouth daily. gabapentin (NEURONTIN) 100 mg capsule 11/13/2018 at [address] [place] [address] Yes Yes Sig: Take 100 mg by mouth daily. insulin detemir (LEVEMIR FLEXTOUCH) 100 unit/mL (3 mL) inpn 2018 at 0700 Self Yes Yes Si Units by SubCUTAneous route ACB/HS. insulin lispro (HUMALOG) 100 unit/mL kwikpen 2018 at am Self Yes Yes Sig: by SubCUTAneous route Before breakfast, lunch, and dinner. Sliding Scale  
lisinopril (PRINIVIL, ZESTRIL) 20 mg tablet 2018 at 7am Self Yes Yes Sig: Take 20 mg by mouth daily. Facility-Administered Medications: None Thank you, Magdaleno Barnes, PharmD, BCPS

## 2018-11-13 NOTE — H&P
[address] 
[phone] Admission History and Physical 
 
 
NAME:              Raisa Marie :   1945 MRN:  [id number] PCP:  Neyda Harmon MD  
 
Date:     2018 Chief  Complaint: Cough, SOB History Of Presenting Illness: Ms. Monette Romberg is a 72 y.o. female who is being admitted for a suspected community acquired pneumonia of left upper lobe of lung. Ms. Monette Romberg presented to our Emergency Department today complaining of moderate SOB, at rest, worse with exertion and associated with a minimally productive cough. She denies any fever but says she has been experiencing flu like symptoms. She was seen in the ED a few days ago when a CTA chest done showed no PE but suspected pneumonia. She was started on levofloxacin but did not show any improvement. She was reviewed by her PCP and antibiotics changes to Augmentin but still her symptoms have persisted. Today in the ED, she was hypoxic and a repeat CTA chest showed persistent air space disease. She will be admitted for further management. Allergies Allergen Reactions  Byetta [Exenatide] Nausea and Vomiting  Metformin Nausea and Vomiting Prior to Admission medications Medication Sig Start Date End Date Taking? Authorizing Provider  
gabapentin (NEURONTIN) 100 mg capsule Take 100 mg by mouth daily. Yes Provider, Historical  
insulin lispro (HUMALOG) 100 unit/mL kwikpen by SubCUTAneous route Before breakfast, lunch, and dinner. Sliding Scale   Yes Provider, Historical  
lisinopril (PRINIVIL, ZESTRIL) 20 mg tablet Take 20 mg by mouth daily. Yes Provider, Historical  
amoxicillin-clavulanate (AUGMENTIN) 875-125 mg per tablet Take 1 Tab by mouth every twelve (12) hours.  X 10 days started 18 (first dose taken in the evening 11/12/18 11/21/18 Yes Provider, Historical  
acetaminophen (TYLENOL) 500 mg tablet Take 1,000 mg by mouth two (2) times daily as needed for Pain. Yes Provider, Historical  
insulin detemir (LEVEMIR FLEXTOUCH) 100 unit/mL (3 mL) inpn 60 Units by SubCUTAneous route ACB/HS. Yes Provider, Historical  
aspirin delayed-release 81 mg tablet Take 81 mg by mouth daily. Yes Provider, Historical  
ALPRAZolam (XANAX) 1 mg tablet Take 0.5 mg by mouth nightly as needed for Sleep. Yes Provider, Historical  
 
 
Past Medical History:  
Diagnosis Date  Anxiety and depression   
 anxiety/depression  Arthritis   
 osteo  Chronic pain   
 knee pain,jointpain,muscle pain, Back pain  DM (diabetes mellitus) ([facility] [address]  Kidney mass   
 mass on kidney  Nausea & vomiting  Sleep apnea Past Surgical History:  
Procedure Laterality Date  BREAST SURGERY PROCEDURE UNLISTED    
 breast biopsy  HX OTHER SURGICAL    
 left foot surgery. 18srews, 3plates Social History Tobacco Use  Smoking status: Former Smoker Packs/day: 2.00 Years: 25.00 Pack years: 50.00 Last attempt to quit: 11/22/2002 Years since quitting: 15.9  Smokeless tobacco: Never Used Substance Use Topics  Alcohol use: No  
  Alcohol/week: 0.6 oz Types: 1 Cans of beer per week Family History Problem Relation Age of Onset  Heart Disease Mother  Stroke Mother  Hypertension Father  Diabetes Sister Review of Systems: 
Constitutional ROS: no fever, chills, rigors or night sweats Respiratory ROS: + cough, + sputum, + hemoptysis, + dyspnea but no pleuritic pain. Cardiovascular ROS: no chest pain, palpitations, orthopnea, PND or syncope Endocrine ROS: no polydispsia, polyuria, heat or cold intolerance or major weight change. Gastrointestinal ROS: no dysphagia, abdominal pain, nausea, vomiting or diarrhea Genito-Urinary ROS: no dysuria, frequency, hematuria, retention or flank pain Musculoskeletal ROS: no joint pain, swelling or muscular tenderness Neurological ROS: no headache, confusion, focal weakness or any other neurological symptoms Psychiatric ROS: no depression, anxiety, mood swings Dermatological ROS: no rash, pruritis, or urticaria Heme-Lymph ROS: no swollen glands, bleeding Examination: 
 
Constitutional:   
Visit Vitals /75 Pulse 92 Temp 98.6 °F (37 °C) Resp 26 Ht 5' 4\" (1.626 m) Wt 108.9 kg (240 lb) SpO2 94% BMI 41.20 kg/m² General:  Weak and ill looking patient in no acute distress Eyes: Pink conjunctivae, PERRLA with no discharge. Normal eye movements Ear, Nose, Mouth & Throat: No ottorrhea, rhinorrhea, non tender sinuses, moist mucous membranes Respiratory:  No accessory muscle use, decreased but clear breath sounds without crackles or wheezes Cardiovascular:  No JVD or murmurs, regular and normal S1, S2 without thrills, bruits or peripheral edema. Capillary refil+, good distal pulses GI & :  Soft abdomen, non-tender, non-distended, normoactive bowel sounds with no palpable organomegaly Heme:  No cervical or axillary adenopathy. Musculoskeletal:  No cyanosis, clubbing, atrophy or deformities Skin:  No rashes, bruising or ulcers Neurological: Awake and alert, speech is clear, CNs 2-12 are grossly intact and otherwise non focal 
Psychiatric:  Has a good insight and is oriented x 3 
________________________________________________________________________ Data Review: 
 
Labs: 
 
Recent Labs 11/13/18 
0913 WBC 11.7* HGB 11.8 HCT 37.6  Recent Labs 11/13/18 
0913   
K 3.8  CO2 31 * BUN 15  
CREA 0.81 CA 8.5 No components found for: [place] No results for input(s): PH, PCO2, PO2, HCO3, FIO2 in the last 72 hours. No results for input(s): INR in the last 72 hours. No lab exists for component: INREXT Radiological Studies: CTA chest - No acute pulmonary embolism. Stable dilatation of the main pulmonary artery suggesting pulmonary artery hypertension. New trace left pleural effusion. Increased airspace opacity in the medial aspect of the left upper lobe and unchanged patchy opacities in the remainder of the lungs. Personally reviewed 12 lead EKG: Normal rate, rhythm, axis and intervals. and No acute changes suggestive of ischemia I have also reviewed available old medical records. Assessment & Impression: Ms. Maryjean Lesches is a 72 y.o. female being evaluated for:  
 
Principal Problem: 
  Community acquired pneumonia of left upper lobe of lung ([address] (11/13/2018) Active Problems: KOMAL on CPAP (9/14/2012) Type II or unspecified type diabetes mellitus with neurological manifestations, uncontrolled(250.62) ([address] (10/19/2012) HTN (hypertension), benign (1/18/2013) SOB (shortness of breath) (11/13/2018) Anxiety with depression (11/13/2018) Plan of management: 
 
Community acquired pneumonia of left upper lobe of lung ([address] (11/13/2018): suspected. Likely may have a viral component. No improvement with out patient treatments. Admit to hospital. Start IV ceftriaxone and azithromycin. Chest a RVP. Oxygen as needed. Nebs as needed SOB (shortness of breath) (11/13/2018): likely from pneumonia. Maybe underlying COPD with Hx of smoking vs pulm HTN. Treat as above. EKG was non ischemic. Get echo Type II or unspecified type diabetes mellitus with neurological manifestations, uncontrolled(250.62) ([address] (10/19/2012): check A1c. Resume home Insulin. SSI per protocol. DM diet HTN (hypertension), benign (1/18/2013): resume Lisinopril. Monitor BPs Anxiety with depression (11/13/2018): resume home medications KOMAL on CPAP (9/14/2012): resume CPAP at night Code Status:  Full Surrogate decision maker: Family Risk of deterioration: high Total time spent for the care of the patient: 79 Minutes Care Plan discussed with: Patient, Family, Nursing Staff and ED physician Discussed:  Code Status, Care Plan and D/C Planning Prophylaxis:  Lovenox Probable Disposition:  Home w/Family 
        
___________________________________________________ Attending Physician: Lina Sow MD

## 2018-11-13 NOTE — ED TRIAGE NOTES
\"I came in here Saturday and they told me I had pneumonia and they wanted me to stay but I didn't. Now, it's getting worse and I need to come in.\"  Patient audibly wheezing in triage.

## 2018-11-14 LAB
ALBUMIN SERPL-MCNC: 2.6 G/DL (ref 3.5–5)
ALBUMIN/GLOB SERPL: 0.6 {RATIO} (ref 1.1–2.2)
ALP SERPL-CCNC: 93 U/L (ref 45–117)
ALT SERPL-CCNC: 43 U/L (ref 12–78)
ANION GAP SERPL CALC-SCNC: 5 MMOL/L (ref 5–15)
AST SERPL-CCNC: 23 U/L (ref 15–37)
BASOPHILS # BLD: 0 K/UL (ref 0–0.1)
BASOPHILS NFR BLD: 0 % (ref 0–1)
BILIRUB SERPL-MCNC: 0.3 MG/DL (ref 0.2–1)
BUN SERPL-MCNC: 16 MG/DL (ref 6–20)
BUN/CREAT SERPL: 25 (ref 12–20)
CALCIUM SERPL-MCNC: 8.3 MG/DL (ref 8.5–10.1)
CHLORIDE SERPL-SCNC: 106 MMOL/L (ref 97–108)
CO2 SERPL-SCNC: 32 MMOL/L (ref 21–32)
CREAT SERPL-MCNC: 0.65 MG/DL (ref 0.55–1.02)
DIFFERENTIAL METHOD BLD: ABNORMAL
EOSINOPHIL # BLD: 0.5 K/UL (ref 0–0.4)
EOSINOPHIL NFR BLD: 5 % (ref 0–7)
ERYTHROCYTE [DISTWIDTH] IN BLOOD BY AUTOMATED COUNT: 12.5 % (ref 11.5–14.5)
GLOBULIN SER CALC-MCNC: 4.3 G/DL (ref 2–4)
GLUCOSE BLD STRIP.AUTO-MCNC: 101 MG/DL (ref 65–100)
GLUCOSE BLD STRIP.AUTO-MCNC: 105 MG/DL (ref 65–100)
GLUCOSE BLD STRIP.AUTO-MCNC: 140 MG/DL (ref 65–100)
GLUCOSE BLD STRIP.AUTO-MCNC: 183 MG/DL (ref 65–100)
GLUCOSE SERPL-MCNC: 135 MG/DL (ref 65–100)
HCT VFR BLD AUTO: 36.4 % (ref 35–47)
HGB BLD-MCNC: 11.4 G/DL (ref 11.5–16)
IMM GRANULOCYTES # BLD: 0.1 K/UL (ref 0–0.04)
IMM GRANULOCYTES NFR BLD AUTO: 1 % (ref 0–0.5)
LYMPHOCYTES # BLD: 1.9 K/UL (ref 0.8–3.5)
LYMPHOCYTES NFR BLD: 19 % (ref 12–49)
MCH RBC QN AUTO: 30.2 PG (ref 26–34)
MCHC RBC AUTO-ENTMCNC: 31.3 G/DL (ref 30–36.5)
MCV RBC AUTO: 96.6 FL (ref 80–99)
MONOCYTES # BLD: 1 K/UL (ref 0–1)
MONOCYTES NFR BLD: 10 % (ref 5–13)
NEUTS SEG # BLD: 6.8 K/UL (ref 1.8–8)
NEUTS SEG NFR BLD: 66 % (ref 32–75)
NRBC # BLD: 0 K/UL (ref 0–0.01)
NRBC BLD-RTO: 0 PER 100 WBC
PLATELET # BLD AUTO: 213 K/UL (ref 150–400)
PMV BLD AUTO: 10.8 FL (ref 8.9–12.9)
POTASSIUM SERPL-SCNC: 4.1 MMOL/L (ref 3.5–5.1)
PROT SERPL-MCNC: 6.9 G/DL (ref 6.4–8.2)
RBC # BLD AUTO: 3.77 M/UL (ref 3.8–5.2)
SERVICE CMNT-IMP: ABNORMAL
SODIUM SERPL-SCNC: 143 MMOL/L (ref 136–145)
WBC # BLD AUTO: 10.3 K/UL (ref 3.6–11)

## 2018-11-14 PROCEDURE — 94760 N-INVAS EAR/PLS OXIMETRY 1: CPT

## 2018-11-14 PROCEDURE — C8929 TTE W OR WO FOL WCON,DOPPLER: HCPCS

## 2018-11-14 PROCEDURE — 65270000029 HC RM PRIVATE

## 2018-11-14 PROCEDURE — 80053 COMPREHEN METABOLIC PANEL: CPT

## 2018-11-14 PROCEDURE — 65660000000 HC RM CCU STEPDOWN

## 2018-11-14 PROCEDURE — 85025 COMPLETE CBC W/AUTO DIFF WBC: CPT

## 2018-11-14 PROCEDURE — 77030029684 HC NEB SM VOL KT MONA -A

## 2018-11-14 PROCEDURE — 36415 COLL VENOUS BLD VENIPUNCTURE: CPT

## 2018-11-14 PROCEDURE — 94640 AIRWAY INHALATION TREATMENT: CPT

## 2018-11-14 PROCEDURE — 74011000250 HC RX REV CODE- 250: Performed by: INTERNAL MEDICINE

## 2018-11-14 PROCEDURE — 82962 GLUCOSE BLOOD TEST: CPT

## 2018-11-14 PROCEDURE — 74011250636 HC RX REV CODE- 250/636: Performed by: INTERNAL MEDICINE

## 2018-11-14 PROCEDURE — 77010033678 HC OXYGEN DAILY

## 2018-11-14 PROCEDURE — 74011250637 HC RX REV CODE- 250/637: Performed by: INTERNAL MEDICINE

## 2018-11-14 RX ORDER — IPRATROPIUM BROMIDE AND ALBUTEROL SULFATE 2.5; .5 MG/3ML; MG/3ML
3 SOLUTION RESPIRATORY (INHALATION)
Status: DISCONTINUED | OUTPATIENT
Start: 2018-11-14 | End: 2018-11-15 | Stop reason: HOSPADM

## 2018-11-14 RX ORDER — LEVOFLOXACIN 750 MG/1
750 TABLET ORAL EVERY 24 HOURS
Status: DISCONTINUED | OUTPATIENT
Start: 2018-11-14 | End: 2018-11-15 | Stop reason: HOSPADM

## 2018-11-14 RX ORDER — CODEINE PHOSPHATE AND GUAIFENESIN 10; 100 MG/5ML; MG/5ML
10 SOLUTION ORAL
Status: DISCONTINUED | OUTPATIENT
Start: 2018-11-14 | End: 2018-11-15 | Stop reason: HOSPADM

## 2018-11-14 RX ADMIN — IPRATROPIUM BROMIDE AND ALBUTEROL SULFATE 3 ML: .5; 3 SOLUTION RESPIRATORY (INHALATION) at 05:59

## 2018-11-14 RX ADMIN — ASPIRIN 81 MG: 81 TABLET ORAL at 08:35

## 2018-11-14 RX ADMIN — PERFLUTREN 2 ML: 6.52 INJECTION, SUSPENSION INTRAVENOUS at 10:24

## 2018-11-14 RX ADMIN — IPRATROPIUM BROMIDE AND ALBUTEROL SULFATE 3 ML: .5; 3 SOLUTION RESPIRATORY (INHALATION) at 19:08

## 2018-11-14 RX ADMIN — Medication 10 ML: at 22:00

## 2018-11-14 RX ADMIN — IPRATROPIUM BROMIDE AND ALBUTEROL SULFATE 3 ML: .5; 3 SOLUTION RESPIRATORY (INHALATION) at 13:59

## 2018-11-14 RX ADMIN — Medication 5 ML: at 06:00

## 2018-11-14 RX ADMIN — LISINOPRIL 20 MG: 20 TABLET ORAL at 08:34

## 2018-11-14 RX ADMIN — GUAIFENESIN AND CODEINE PHOSPHATE 10 ML: 100; 10 SOLUTION ORAL at 13:57

## 2018-11-14 RX ADMIN — ACETAMINOPHEN 650 MG: 325 TABLET, FILM COATED ORAL at 16:02

## 2018-11-14 RX ADMIN — AZITHROMYCIN MONOHYDRATE 500 MG: 500 INJECTION, POWDER, LYOPHILIZED, FOR SOLUTION INTRAVENOUS at 10:17

## 2018-11-14 RX ADMIN — LEVOFLOXACIN 750 MG: 750 TABLET, FILM COATED ORAL at 22:32

## 2018-11-14 RX ADMIN — ACETAMINOPHEN 650 MG: 325 TABLET, FILM COATED ORAL at 11:59

## 2018-11-14 RX ADMIN — GABAPENTIN 100 MG: 100 CAPSULE ORAL at 08:35

## 2018-11-14 NOTE — ROUTINE PROCESS
Bedside and Verbal shift change report given to Bonny Breaux (oncoming nurse) by Devon Aguilar (offgoing nurse). Report included the following information SBAR, Kardex, ED Summary, Procedure Summary, Intake/Output, MAR and Recent Results.

## 2018-11-14 NOTE — PROGRESS NOTES
[facility] Medical Progress Note NAME: Brandon Zuniga :  1945 MRM:  [id number] Date/Time: 2018  2:24 PM 
 
  
Assessment and Plan:  
 
Community acquired pneumonia of left upper lobe of lung ([facility] [address] (2018): suspected. Likely may have a viral component. Wean IV ceftriaxone and azithromycin to levofloxacin mono-therapy. RVP negative. Oxygen as needed. Nebs as needed 
  
SOB (shortness of breath) (2018): likely from pneumonia. Suspect that this is underlying COPD. TTE with good LVEF and only mildly elevated PAH. Will need outpatient pulm referral 
  
Type II or unspecified type diabetes mellitus with neurological manifestations, uncontrolled(250.62) ([facility] [address] (10/19/2012): 8.2% A1c. Resume home Insulin. SSI per protocol. DM diet 
  
HTN (hypertension), benign (2013): resume Lisinopril. Monitor BPs 
  
Anxiety with depression (2018): resume home medications 
  
KOMAL on CPAP (2012): resume CPAP at night Subjective: Chief Complaint:  Patient seen and examined. Chart reviewed. Breathing is improving but she continues to have cough. ROS: 
(bold if positive, if negative) Cough Tolerating PT  Tolerating Diet Objective:  
 
Last 24hrs VS reviewed since prior progress note. Most recent are: 
 
Visit Vitals /78 (BP 1 Location: Right arm, BP Patient Position: At rest;Sitting) Pulse 82 Temp 98.2 °F (36.8 °C) Resp 22 Ht 5' 4\" (1.626 m) Wt 108.9 kg (240 lb) SpO2 95% Breastfeeding? No  
BMI 41.20 kg/m² SpO2 Readings from Last 6 Encounters:  
18 95% 11/10/18 93% 17 93% 17 94% 17 95% 16 95% O2 Flow Rate (L/min): 2 l/min Intake/Output Summary (Last 24 hours) at 2018 1424 Last data filed at 2018 2036 Gross per 24 hour Intake 465 ml Output  Net 465 ml Physical Exam: 
 
Gen:  Well-developed, well-nourished, in no acute distress HEENT:  Pink conjunctivae, PERRL, hearing intact to voice, moist mucous membranes Neck:  Supple, without masses, thyroid non-tender Resp:  No accessory muscle use, scant wheezing bilaterally Card:  No murmurs, normal S1, S2 without thrills, bruits or peripheral edema Abd:  Soft, non-tender, non-distended, normoactive bowel sounds are present, no palpable organomegaly and no detectable hernias Lymph:  No cervical or inguinal adenopathy Musc:  No cyanosis or clubbing Skin:  No rashes or ulcers, skin turgor is good Neuro:  Cranial nerves are grossly intact, no focal motor weakness, follows commands appropriately Psych:  Good insight, oriented to person, place and time, alert 
 
__________________________________________________________________ Medications Reviewed: (see below) Medications:  
 
Current Facility-Administered Medications Medication Dose Route Frequency  guaiFENesin-codeine (ROBITUSSIN AC) 100-10 mg/5 mL solution 10 mL  10 mL Oral Q6H PRN  
 albuterol-ipratropium (DUO-NEB) 2.5 MG-0.5 MG/3 ML  3 mL Nebulization Q6H RT  
 sodium chloride (NS) flush 5-10 mL  5-10 mL IntraVENous Q8H  
 sodium chloride (NS) flush 5-10 mL  5-10 mL IntraVENous PRN  
 acetaminophen (TYLENOL) tablet 650 mg  650 mg Oral Q4H PRN  
 morphine injection 2 mg  2 mg IntraVENous Q4H PRN  
 naloxone (NARCAN) injection 0.4 mg  0.4 mg IntraVENous PRN  
 diphenhydrAMINE (BENADRYL) injection 12.5 mg  12.5 mg IntraVENous Q4H PRN  
 ondansetron (ZOFRAN) injection 4 mg  4 mg IntraVENous Q4H PRN  
 insulin lispro (HUMALOG) injection   SubCUTAneous AC&HS  
 glucose chewable tablet 16 g  4 Tab Oral PRN  
 dextrose (D50W) injection syrg 12.5-25 g  12.5-25 g IntraVENous PRN  
 glucagon (GLUCAGEN) injection 1 mg  1 mg IntraMUSCular PRN  
 azithromycin (ZITHROMAX) 500 mg in 0.9% sodium chloride (MBP/ADV) 250 mL  500 mg IntraVENous Q24H  ALPRAZolam (XANAX) tablet 0.5 mg  0.5 mg Oral QHS PRN  
  aspirin delayed-release tablet 81 mg  81 mg Oral DAILY  gabapentin (NEURONTIN) capsule 100 mg  100 mg Oral DAILY  lisinopril (PRINIVIL, ZESTRIL) tablet 20 mg  20 mg Oral DAILY  insulin glargine (LANTUS) injection 30 Units  30 Units SubCUTAneous QHS  enoxaparin (LOVENOX) injection 40 mg  40 mg SubCUTAneous Q12H  cefTRIAXone (ROCEPHIN) 1 g in 0.9% sodium chloride (MBP/ADV) 50 mL  1 g IntraVENous Q24H Lab Data Reviewed: (see below) Lab Review:  
 
Recent Labs 11/14/18 0335 11/13/18 
4204 WBC 10.3 11.7* HGB 11.4* 11.8 HCT 36.4 37.6  233 Recent Labs 11/14/18 0335 11/13/18 
4142  142  
K 4.1 3.8  105 CO2 32 31 * 199* BUN 16 15 CREA 0.65 0.81 CA 8.3* 8.5 ALB 2.6*  --   
TBILI 0.3  --   
SGOT 23  --   
ALT 43  --   
 
Lab Results Component Value Date/Time Glucose (POC) 105 (H) 11/14/2018 10:57 AM  
 Glucose (POC) 183 (H) 11/14/2018 07:57 AM  
 Glucose (POC) 123 (H) 11/13/2018 09:07 PM  
 Glucose (POC) 216 (H) 11/13/2018 04:31 PM  
 Glucose (POC) 220 (H) 02/23/2017 10:07 AM  
 
No results for input(s): PH, PCO2, PO2, HCO3, FIO2 in the last 72 hours. No results for input(s): INR in the last 72 hours. No lab exists for component: INREXT All Micro Results Procedure Component Value Units Date/Time CULTURE, BLOOD [[id number]] Collected:  11/13/18 0913 Order Status:  Completed Specimen:  Blood Updated:  11/14/18 0941 RESPIRATORY PANEL,PCR,NASOPHARYNGEAL [[id number]] Collected:  11/13/18 1138 Order Status:  Completed Specimen:  Nasopharyngeal Updated:  11/13/18 1523 Adenovirus NOT DETECTED Coronavirus 229E NOT DETECTED Coronavirus HKU1 NOT DETECTED Coronavirus CVNL63 NOT DETECTED Coronavirus OC43 NOT DETECTED Metapneumovirus NOT DETECTED Rhinovirus and Enterovirus NOT DETECTED Influenza A NOT DETECTED   Influenza A, subtype H1 NOT DETECTED     
 Influenza A, subtype H3 NOT DETECTED INFLUENZA A H1N1 PCR NOT DETECTED Influenza B NOT DETECTED Parainfluenza 1 NOT DETECTED Parainfluenza 2 NOT DETECTED Parainfluenza 3 NOT DETECTED Parainfluenza virus 4 NOT DETECTED     
  RSV by PCR NOT DETECTED Bordetella pertussis - PCR NOT DETECTED Chlamydophila pneumoniae DNA, QL, PCR NOT DETECTED Mycoplasma pneumoniae DNA, QL, PCR NOT DETECTED     
 CULTURE, BLOOD [[id number]] Order Status:  Sent Specimen:  Blood CULTURE, BLOOD, PAIRED [[id number]] Order Status:  Canceled Specimen:  Blood I have reviewed notes of prior 24hr. Other pertinent lab: None Total time spent with patient: 30 Minutes Care Plan discussed with: Patient, Care Manager and Nursing Staff Discussed:  Care Plan and D/C Planning Prophylaxis:  Lovenox Disposition:  Home w/Family 
        
___________________________________________________ Attending Physician: Jt Church DO

## 2018-11-14 NOTE — PROGRESS NOTES
Bedside and Verbal shift change report given to Hellen Pollock RN (oncoming nurse) by Lela Marin RN (offgoing nurse). Report included the following information SBAR, Kardex, Intake/Output, MAR and Recent Results.

## 2018-11-15 VITALS
TEMPERATURE: 98.2 F | HEART RATE: 82 BPM | SYSTOLIC BLOOD PRESSURE: 172 MMHG | RESPIRATION RATE: 18 BRPM | WEIGHT: 240 LBS | DIASTOLIC BLOOD PRESSURE: 67 MMHG | HEIGHT: 64 IN | BODY MASS INDEX: 40.97 KG/M2 | OXYGEN SATURATION: 96 %

## 2018-11-15 LAB
GLUCOSE BLD STRIP.AUTO-MCNC: 112 MG/DL (ref 65–100)
GLUCOSE BLD STRIP.AUTO-MCNC: 121 MG/DL (ref 65–100)
GLUCOSE BLD STRIP.AUTO-MCNC: 188 MG/DL (ref 65–100)
SERVICE CMNT-IMP: ABNORMAL

## 2018-11-15 PROCEDURE — 94760 N-INVAS EAR/PLS OXIMETRY 1: CPT

## 2018-11-15 PROCEDURE — 74011250637 HC RX REV CODE- 250/637: Performed by: INTERNAL MEDICINE

## 2018-11-15 PROCEDURE — 74011000250 HC RX REV CODE- 250: Performed by: INTERNAL MEDICINE

## 2018-11-15 PROCEDURE — 94761 N-INVAS EAR/PLS OXIMETRY MLT: CPT

## 2018-11-15 PROCEDURE — 94640 AIRWAY INHALATION TREATMENT: CPT

## 2018-11-15 PROCEDURE — 82962 GLUCOSE BLOOD TEST: CPT

## 2018-11-15 PROCEDURE — 77010033678 HC OXYGEN DAILY

## 2018-11-15 RX ORDER — NALOXONE HYDROCHLORIDE 4 MG/.1ML
SPRAY NASAL
Qty: 1 EACH | Refills: 0 | Status: SHIPPED | OUTPATIENT
Start: 2018-11-15 | End: 2021-09-20

## 2018-11-15 RX ORDER — IPRATROPIUM BROMIDE AND ALBUTEROL SULFATE 2.5; .5 MG/3ML; MG/3ML
3 SOLUTION RESPIRATORY (INHALATION)
Qty: 30 NEBULE | Refills: 0 | Status: SHIPPED | OUTPATIENT
Start: 2018-11-15 | End: 2021-02-11 | Stop reason: ALTCHOICE

## 2018-11-15 RX ORDER — CODEINE PHOSPHATE AND GUAIFENESIN 10; 100 MG/5ML; MG/5ML
10 SOLUTION ORAL
Qty: 120 ML | Refills: 0 | Status: SHIPPED | OUTPATIENT
Start: 2018-11-15 | End: 2021-02-11 | Stop reason: ALTCHOICE

## 2018-11-15 RX ADMIN — Medication 10 ML: at 06:00

## 2018-11-15 RX ADMIN — GABAPENTIN 100 MG: 100 CAPSULE ORAL at 09:26

## 2018-11-15 RX ADMIN — IPRATROPIUM BROMIDE AND ALBUTEROL SULFATE 3 ML: .5; 3 SOLUTION RESPIRATORY (INHALATION) at 13:22

## 2018-11-15 RX ADMIN — IPRATROPIUM BROMIDE AND ALBUTEROL SULFATE 3 ML: .5; 3 SOLUTION RESPIRATORY (INHALATION) at 07:16

## 2018-11-15 RX ADMIN — GUAIFENESIN AND CODEINE PHOSPHATE 10 ML: 100; 10 SOLUTION ORAL at 01:15

## 2018-11-15 RX ADMIN — ASPIRIN 81 MG: 81 TABLET ORAL at 09:26

## 2018-11-15 RX ADMIN — Medication 10 ML: at 14:00

## 2018-11-15 RX ADMIN — LISINOPRIL 20 MG: 20 TABLET ORAL at 09:26

## 2018-11-15 RX ADMIN — ACETAMINOPHEN 650 MG: 325 TABLET, FILM COATED ORAL at 01:18

## 2018-11-15 NOTE — PROGRESS NOTES
[facility] Medical Progress Note NAME: Norah Millan :  1945 MRM:  [id number] Date/Time: 11/15/2018  2:24 PM 
 
  
Assessment and Plan:  
 
Community acquired pneumonia of left upper lobe of lung ([address] (2018): suspected. Likely may have a viral component but Resp panel negative. Weaned IV ceftriaxone and azithromycin to levofloxacin mono-therapy. Remains hypoxic. Check 6MW with RT. Will ask pulmonology to see to assist with management. 
  
SOB (shortness of breath) (2018) / Hypoxia / COPD: Historically she has findings c/w COPD and followings with a pulmonologist. TTE with good LVEF and only mildly elevated PAH. She really doesn't have any wheezing or LE edema that would denote this much hypoxia. Pulm to see 
  
Type II or unspecified type diabetes mellitus with neurological manifestations, uncontrolled (10/19/2012): 8.2% A1c. Continue home Insulin. SSI per protocol. DM diet 
  
HTN (hypertension), benign (2013): Continue Lisinopril. Monitor BPs 
  
Anxiety with depression (2018): Continue home medications 
  
KOMAL on CPAP (2012): Continue CPAP at night Subjective: Chief Complaint:  Patient seen and examined. Chart reviewed. Breathing is improving but she continues to have cough. SOB with walking ROS: 
(bold if positive, if negative) CoughDOE Tolerating PT  Tolerating Diet Objective:  
 
Last 24hrs VS reviewed since prior progress note. Most recent are: 
 
Visit Vitals BP (P) 173/79 (BP 1 Location: Left arm, BP Patient Position: Sitting) Pulse (P) 78 Temp (P) 98.7 °F (37.1 °C) Resp (P) 18 Ht 5' 4\" (1.626 m) Wt 108.9 kg (240 lb) SpO2 (P) 94% Breastfeeding? No  
BMI 41.20 kg/m² SpO2 Readings from Last 6 Encounters:  
11/15/18 (P) 94% 11/10/18 93% 17 93% 17 94% 17 95% 16 95% O2 Flow Rate (L/min): 2 l/min Intake/Output Summary (Last 24 hours) at 11/15/2018 1206 Last data filed at 11/15/2018 3106 Gross per 24 hour Intake 360 ml Output  Net 360 ml Physical Exam: 
 
Gen:  Well-developed, well-nourished, in no acute distress HEENT:  Pink conjunctivae, PERRL, hearing intact to voice, moist mucous membranes Neck:  Supple, without masses, thyroid non-tender Resp:  No accessory muscle use, no wheezing on my exam 
Card:  No murmurs, normal S1, S2 without thrills, bruits or peripheral edema Abd:  Soft, non-tender, non-distended, normoactive bowel sounds are present, no palpable organomegaly and no detectable hernias Lymph:  No cervical or inguinal adenopathy Musc:  No cyanosis or clubbing Skin:  No rashes or ulcers, skin turgor is good Neuro:  Cranial nerves are grossly intact, no focal motor weakness, follows commands appropriately Psych:  Good insight, oriented to person, place and time, alert 
 
__________________________________________________________________ Medications Reviewed: (see below) Medications:  
 
Current Facility-Administered Medications Medication Dose Route Frequency  guaiFENesin-codeine (ROBITUSSIN AC) 100-10 mg/5 mL solution 10 mL  10 mL Oral Q6H PRN  
 albuterol-ipratropium (DUO-NEB) 2.5 MG-0.5 MG/3 ML  3 mL Nebulization Q6H RT  
 levoFLOXacin (LEVAQUIN) tablet 750 mg  750 mg Oral Q24H  
 sodium chloride (NS) flush 5-10 mL  5-10 mL IntraVENous Q8H  
 sodium chloride (NS) flush 5-10 mL  5-10 mL IntraVENous PRN  
 acetaminophen (TYLENOL) tablet 650 mg  650 mg Oral Q4H PRN  
 morphine injection 2 mg  2 mg IntraVENous Q4H PRN  
 naloxone (NARCAN) injection 0.4 mg  0.4 mg IntraVENous PRN  
 diphenhydrAMINE (BENADRYL) injection 12.5 mg  12.5 mg IntraVENous Q4H PRN  
 ondansetron (ZOFRAN) injection 4 mg  4 mg IntraVENous Q4H PRN  
 insulin lispro (HUMALOG) injection   SubCUTAneous AC&HS  
 glucose chewable tablet 16 g  4 Tab Oral PRN  
 dextrose (D50W) injection syrg 12.5-25 g  12.5-25 g IntraVENous PRN  
  glucagon (GLUCAGEN) injection 1 mg  1 mg IntraMUSCular PRN  
 ALPRAZolam (XANAX) tablet 0.5 mg  0.5 mg Oral QHS PRN  
 aspirin delayed-release tablet 81 mg  81 mg Oral DAILY  gabapentin (NEURONTIN) capsule 100 mg  100 mg Oral DAILY  lisinopril (PRINIVIL, ZESTRIL) tablet 20 mg  20 mg Oral DAILY  insulin glargine (LANTUS) injection 30 Units  30 Units SubCUTAneous QHS  enoxaparin (LOVENOX) injection 40 mg  40 mg SubCUTAneous Q12H Lab Data Reviewed: (see below) Lab Review:  
 
Recent Labs 11/14/18 0335 11/13/18 
2768 WBC 10.3 11.7* HGB 11.4* 11.8 HCT 36.4 37.6  233 Recent Labs 11/14/18 0335 11/13/18 
6892  142  
K 4.1 3.8  105 CO2 32 31 * 199* BUN 16 15 CREA 0.65 0.81 CA 8.3* 8.5 ALB 2.6*  --   
TBILI 0.3  --   
SGOT 23  --   
ALT 43  --   
 
Lab Results Component Value Date/Time Glucose (POC) 112 (H) 11/15/2018 11:35 AM  
 Glucose (POC) 188 (H) 11/15/2018 07:49 AM  
 Glucose (POC) 101 (H) 11/14/2018 09:57 PM  
 Glucose (POC) 140 (H) 11/14/2018 03:48 PM  
 Glucose (POC) 105 (H) 11/14/2018 10:57 AM  
 
No results for input(s): PH, PCO2, PO2, HCO3, FIO2 in the last 72 hours. No results for input(s): INR in the last 72 hours. No lab exists for component: INREXT, INREXT All Micro Results Procedure Component Value Units Date/Time CULTURE, BLOOD [[id number]] Collected:  11/13/18 0913 Order Status:  Completed Specimen:  Blood Updated:  11/15/18 0842 Special Requests: NO SPECIAL REQUESTS Culture result: NO GROWTH AFTER 22 HOURS     
 RESPIRATORY PANEL,PCR,NASOPHARYNGEAL [[id number]] Collected:  11/13/18 1138 Order Status:  Completed Specimen:  Nasopharyngeal Updated:  11/13/18 1523 Adenovirus NOT DETECTED Coronavirus 229E NOT DETECTED Coronavirus HKU1 NOT DETECTED Coronavirus CVNL63 NOT DETECTED Coronavirus OC43 NOT DETECTED   Metapneumovirus NOT DETECTED     
 Rhinovirus and Enterovirus NOT DETECTED Influenza A NOT DETECTED Influenza A, subtype H1 NOT DETECTED Influenza A, subtype H3 NOT DETECTED INFLUENZA A H1N1 PCR NOT DETECTED Influenza B NOT DETECTED Parainfluenza 1 NOT DETECTED Parainfluenza 2 NOT DETECTED Parainfluenza 3 NOT DETECTED Parainfluenza virus 4 NOT DETECTED     
  RSV by PCR NOT DETECTED Bordetella pertussis - PCR NOT DETECTED Chlamydophila pneumoniae DNA, QL, PCR NOT DETECTED Mycoplasma pneumoniae DNA, QL, PCR NOT DETECTED     
 CULTURE, BLOOD [[id number]] Order Status:  Sent Specimen:  Blood CULTURE, BLOOD, PAIRED [[id number]] Order Status:  Canceled Specimen:  Blood I have reviewed notes of prior 24hr. Other pertinent lab: None Total time spent with patient: 35 Minutes Care Plan discussed with: Patient, Care Manager and Nursing Staff Discussed:  Care Plan and D/C Planning Prophylaxis:  Lovenox Disposition:  Home w/Family 
        
___________________________________________________ Attending Physician: Haily Knight DO

## 2018-11-15 NOTE — PROGRESS NOTES
Bedside shift change report given to Nadiya (oncoming nurse) by Anju Jerome (offgoing nurse). Report included the following information SBAR, Kardex, Intake/Output, MAR, Accordion and Recent Results.

## 2018-11-15 NOTE — DISCHARGE SUMMARY
Physician Discharge Summary     Patient ID:  Pop Hope  [id number]  84 y.o.  1945    Admit date: 11/13/2018    Discharge date and time: 11/15/2018    Admission Diagnoses: SOB (shortness of breath)    Discharge Diagnoses:    Principal Diagnosis   Community acquired pneumonia of left upper lobe of lung ([facility]                                             Other Diagnoses  Principal Problem:    Community acquired pneumonia of left upper lobe of lung ([facility] (11/13/2018)    Active Problems:    KOMAL on CPAP (9/14/2012)      Type II or unspecified type diabetes mellitus with neurological manifestations, uncontrolled(250.62) ([facility] (10/19/2012)      HTN (hypertension), benign (1/18/2013)      SOB (shortness of breath) (11/13/2018)      Anxiety with depression (11/13/2018)         Hospital Course:     Community acquired pneumonia of left upper lobe of lung ([facility] (11/13/2018): suspected. Likely may have a viral component but Resp panel negative. Weaned IV ceftriaxone and azithromycin to levofloxacin mono-therapy. Remains hypoxic. 6MW with RT rec oxygen. Will ask pulmonology to see to assist with management and ensure nothing else is needed.     SOB (shortness of breath) (11/13/2018) / Hypoxia / COPD: Historically she has findings c/w COPD and followings with a pulmonologist. TTE with good LVEF and only mildly elevated PAH. She really doesn't have any wheezing or LE edema that would denote this much hypoxia. Pulm to see     Type II or unspecified type diabetes mellitus with neurological manifestations, uncontrolled (10/19/2012): 8.2% A1c. Continue home Insulin. SSI per protocol. DM diet     HTN (hypertension), benign: Continue Lisinopril.  Monitor BPs     Anxiety with depression (11/13/2018): Continue home medications     KOMAL on CPAP (9/14/2012): Continue CPAP at night      PCP: Sterling Lang MD    Consults: Pulmonary/Intensive care    Significant Diagnostic Studies: See Hospital Course    Discharged home in improved condition. Discharge Exam:    Visit Vitals  /67 (BP 1 Location: Left arm, BP Patient Position: At rest)   Pulse 82   Temp 98.2 °F (36.8 °C)   Resp 18   Ht 5' 4\" (1.626 m)   Wt 108.9 kg (240 lb)   SpO2 96%   Breastfeeding? No   BMI 41.20 kg/m²     Physical Exam:     Gen:  Well-developed, well-nourished, in no acute distress  HEENT:  Pink conjunctivae, PERRL, hearing intact to voice, moist mucous membranes  Neck:  Supple, without masses, thyroid non-tender  Resp:  No accessory muscle use, no wheezing on my exam  Card:  No murmurs, normal S1, S2 without thrills, bruits or peripheral edema  Abd:  Soft, non-tender, non-distended, normoactive bowel sounds are present, no palpable organomegaly and no detectable hernias  Lymph:  No cervical or inguinal adenopathy  Musc:  No cyanosis or clubbing  Skin:  No rashes or ulcers, skin turgor is good  Neuro:  Cranial nerves are grossly intact, no focal motor weakness, follows commands appropriately  Psych:  Good insight, oriented to person, place and time, alert      Disposition: home    Patient Instructions:   Current Discharge Medication List      START taking these medications    Details   albuterol-ipratropium (DUO-NEB) 2.5 mg-0.5 mg/3 ml nebu 3 mL by Nebulization route every six (6) hours as needed. Qty: 30 Nebule, Refills: 0      guaiFENesin-codeine (ROBITUSSIN AC) 100-10 mg/5 mL solution Take 10 mL by mouth every six (6) hours as needed for Cough. Max Daily Amount: 40 mL. Qty: 120 mL, Refills: 0    Associated Diagnoses: Pneumonia of both upper lobes due to infectious organism (HCC)      arformoterol 15 mcg/2 mL nebu 15 mcg, budesonide 0.5 mg/2 mL nbsp 500 mcg 1 Dose by Nebulization route every twelve (12) hours. Qty: 30 Ampule, Refills: 0      naloxone (NARCAN) 4 mg/actuation nasal spray Use 1 spray intranasally, then discard. Repeat with new spray every 2 min as needed for opioid overdose symptoms, alternating nostrils.   Qty: 1 Each, Refills: 0         CONTINUE these medications which have NOT CHANGED    Details   gabapentin (NEURONTIN) 100 mg capsule Take 100 mg by mouth daily. insulin lispro (HUMALOG) 100 unit/mL kwikpen by SubCUTAneous route Before breakfast, lunch, and dinner. Sliding Scale      lisinopril (PRINIVIL, ZESTRIL) 20 mg tablet Take 20 mg by mouth daily. amoxicillin-clavulanate (AUGMENTIN) 875-125 mg per tablet Take 1 Tab by mouth every twelve (12) hours. X 10 days started 11/12/18 (first dose taken in the evening      acetaminophen (TYLENOL) 500 mg tablet Take 1,000 mg by mouth two (2) times daily as needed for Pain. insulin detemir (LEVEMIR FLEXTOUCH) 100 unit/mL (3 mL) inpn 60 Units by SubCUTAneous route ACB/HS. aspirin delayed-release 81 mg tablet Take 81 mg by mouth daily. ALPRAZolam (XANAX) 1 mg tablet Take 0.5 mg by mouth nightly as needed for Sleep.            Activity: Activity as tolerated  Diet: Resume previous diet  Wound Care: None needed    Follow-up with PCP in 1 week    Signed:  Rossi Porras DO  11/15/2018  4:15 PM    Greater than 30 mins was spent in coordination, counseling, and execution of this patient's discharge

## 2018-11-15 NOTE — PROGRESS NOTES
[facility] [place] - INPATIENT Face to Face Encounter Patients Name: Rufino Schaumann    YOB: 1945 Primary Diagnosis: SOB (shortness of breath) Date of Face to Face:   11/15/2018 Face to Face Encounter findings are related to primary reason for home care:   yes. 1. I certify that the patient needs intermittent care as follows: Home oxygen 2. I certify that this patient is homebound, that is: 1) patient requires the use of a cane device, special transportation, or assistance of another to leave the home; or 2) patient's condition makes leaving the home medically contraindicated; and 3) patient has a normal inability to leave the home and leaving the home requires considerable and taxing effort. Patient may leave the home for infrequent and short duration for medical reasons, and occasional absences for non-medical reasons. Homebound status is due to the following functional limitations: Patient with increased shortness of breath and elevated heart rate with ambulation greater than 20 feet limiting patient's ability to ambulate safely within the community. 3. I certify that this patient is under my care and that I, or a nurse practitioner or  [id number], or clinical nurse specialist, or certified nurse midwife, working with me, had a Face-to-Face Encounter that meets the physician Face-to-Face Encounter requirements. The following are the clinical findings from the [address] [place] [address] encounter that support the need for skilled services and is a summary of the encounter: See progress note See attached progess note [id number] [facility], DO 
11/15/2018 Referring or Hospitalist Physician Signature: ______________________________ Date: _____________________ I concur with the findings described above from the [facility] encounter that this patient is homebound and in need of a skilled service. Certifying Physician: _____________________________________ Printed Certifying Physician Name: _____________________________________ Date: _________________

## 2018-11-15 NOTE — PROGRESS NOTES
Oximetry Six Minute Walk Test was performed in hallway. Pt complained of leg/knee pain. Some shortness of breath was noted. Her resting room air oxygen saturation was 93% with a heart rate of 86 bpm.  While walking on room air her O2 saturation dropped to 82%. Heart rate increased to 120 bpm.  She was placed on nasal O2 at 2 lpm by cannula. While walking with oxygen at 2 lpm by cannula her O2 saturation was 92%.   After the walk while resting on nasal O2 at 2 lpm by cannula her O2 saturation was 96% and heart rate decreased to 91 bpm.

## 2018-11-15 NOTE — PROGRESS NOTES
1:26 PM 
CM consult noted for O2. Spoke to pt, pt used [facility] in the past for CPAP machine. Will send referral to [facility]. Nando Dillard 2:33 PM 
JEFFREY received approval from [facility], delivered portable O2 to pt's room. No further needs.   GRECIA Miles

## 2018-11-15 NOTE — CONSULTS
Name: Mushtaq Ferrell: [facility]   : 1945 Admit Date: 2018   Phone: [phone]  Room: [facility]/   PCP: Lani Cobian MD  MRN: [id number]   Date: 11/15/2018  Code: Full Code        HPI:      Chart and notes reviewed. Data reviewed. I review the patient's current medications in the medical record at each encounter.  I have evaluated and examined the patient. 3:45 PM       History was obtained from patient. I was asked by Remington Romero MD to see Catie Mattson in consultation for a chief complaint of hypoxia, probable COPD. History of Present Illness:  Anatoliy Munoz is a pleasant, 72year old lady that was admitted on  with worsening shortness of braeth. She was seen in the ED on 11/10 and diagnosed with pneumonia: she was treated with Levaquin, but did not feel better and her PCP switched her to Augmentin. She continued to feel poorly and thus presented back to the ED when she was admitted. She has seen  of our practice once who diagnosed her with probable asthma. He ordered PFTs with her and started Advair, which she reports did not help. She did not follow-up or have PFTs done yet. Reports worsening shortness of breath, mainly with exertion over the last 8-9 months. She smoked 1ppd for many years and quit 16 years ago. Does not have a rescue inhaler at home. Reports coughing up white, thick phlegm. Has had chills and some fevers. She is also followed by  for severe obstructive sleep apnea and is compliant with CPAP. She is frustrated that she hasn't been allowed to go home yet. Images:  Personally reviewed. Chest CT:  No acute PE. Stable dilation of pulmonary artery. New trace left pleural effusion. Increased airspace opacity in the medial aspect of the KAUSHAL and unchanged patchy opacities in the remainder of the lungs. Differential considerations include nonspecific infection or inflammation.     WBC 10.3  Hgb 11.4  Creat .65  Trop <.05  Lactic acid 1.4  RVP negative      Past Medical History:   Diagnosis Date    Anxiety and depression     anxiety/depression    Arthritis     osteo    Chronic pain     knee pain,jointpain,muscle pain, Back pain    DM (diabetes mellitus) (HCC)     Kidney mass     mass on kidney    Nausea & vomiting     Sleep apnea        Past Surgical History:   Procedure Laterality Date    BREAST SURGERY PROCEDURE UNLISTED      breast biopsy    HX OTHER SURGICAL      left foot surgery.  18srews, 3plates       Family History   Problem Relation Age of Onset    Heart Disease Mother     Stroke Mother     Hypertension Father     Diabetes Sister        Social History     Tobacco Use    Smoking status: Former Smoker     Packs/day: 2.00     Years: 25.00     Pack years: 50.00     Last attempt to quit: 11/22/2002     Years since quitting: 15.9    Smokeless tobacco: Never Used   Substance Use Topics    Alcohol use: No     Alcohol/week: 0.6 oz     Types: 1 Cans of beer per week       Allergies   Allergen Reactions    Byetta [Exenatide] Nausea and Vomiting    Metformin Nausea and Vomiting       Current Facility-Administered Medications   Medication Dose Route Frequency    guaiFENesin-codeine (ROBITUSSIN AC) 100-10 mg/5 mL solution 10 mL  10 mL Oral Q6H PRN    albuterol-ipratropium (DUO-NEB) 2.5 MG-0.5 MG/3 ML  3 mL Nebulization Q6H RT    levoFLOXacin (LEVAQUIN) tablet 750 mg  750 mg Oral Q24H    sodium chloride (NS) flush 5-10 mL  5-10 mL IntraVENous Q8H    sodium chloride (NS) flush 5-10 mL  5-10 mL IntraVENous PRN    acetaminophen (TYLENOL) tablet 650 mg  650 mg Oral Q4H PRN    morphine injection 2 mg  2 mg IntraVENous Q4H PRN    naloxone (NARCAN) injection 0.4 mg  0.4 mg IntraVENous PRN    diphenhydrAMINE (BENADRYL) injection 12.5 mg  12.5 mg IntraVENous Q4H PRN    ondansetron (ZOFRAN) injection 4 mg  4 mg IntraVENous Q4H PRN    insulin lispro (HUMALOG) injection   SubCUTAneous AC&HS    glucose chewable tablet 16 g  4 Tab Oral PRN    dextrose (D50W) injection syrg 12.5-25 g  12.5-25 g IntraVENous PRN    glucagon (GLUCAGEN) injection 1 mg  1 mg IntraMUSCular PRN    ALPRAZolam (XANAX) tablet 0.5 mg  0.5 mg Oral QHS PRN    aspirin delayed-release tablet 81 mg  81 mg Oral DAILY    gabapentin (NEURONTIN) capsule 100 mg  100 mg Oral DAILY    lisinopril (PRINIVIL, ZESTRIL) tablet 20 mg  20 mg Oral DAILY    insulin glargine (LANTUS) injection 30 Units  30 Units SubCUTAneous QHS    enoxaparin (LOVENOX) injection 40 mg  40 mg SubCUTAneous Q12H         REVIEW OF SYSTEMS   Negative except as stated in the HPI. Physical Exam:   Visit Vitals  /67 (BP 1 Location: Left arm, BP Patient Position: At rest)   Pulse 82   Temp 98.2 °F (36.8 °C)   Resp 18   Ht 5' 4\" (1.626 m)   Wt 108.9 kg (240 lb)   SpO2 96%   Breastfeeding? No   BMI 41.20 kg/m²       General:  Alert, cooperative, no distress, appears stated age. Head:  Normocephalic, without obvious abnormality, atraumatic. Eyes:  Conjunctivae/corneas clear. Nose: Nares normal. Septum midline. Mucosa normal.   Throat: Lips, mucosa, and tongue normal. Teeth and gums normal.   Neck: Supple, symmetrical, trachea midline, no adenopathy   Lungs:   Decreased bs bilaterally. Chest wall:  No tenderness or deformity. Heart:  Regular rate and rhythm, S1, S2 normal, no murmur, click, rub or gallop. Abdomen:   Soft, non-tender. Bowel sounds normal.   Extremities: Extremities normal, atraumatic, no cyanosis, trace ankle edema. Pulses: 2+ and symmetric all extremities.    Skin: Skin color, texture, turgor normal.    Lymph nodes: Cervical nodes normal.   Neurologic: Grossly nonfocal       Lab Results   Component Value Date/Time    Sodium 143 11/14/2018 03:35 AM    Potassium 4.1 11/14/2018 03:35 AM    Chloride 106 11/14/2018 03:35 AM    CO2 32 11/14/2018 03:35 AM    BUN 16 11/14/2018 03:35 AM    Creatinine 0.65 11/14/2018 03:35 AM    Glucose 135 (H) 11/14/2018 03:35 AM    Calcium 8.3 (L) 11/14/2018 03:35 AM    Lactic acid 1.4 11/13/2018 09:13 AM       Lab Results   Component Value Date/Time    WBC 10.3 11/14/2018 03:35 AM    HGB 11.4 (L) 11/14/2018 03:35 AM    PLATELET 683 97/17/8437 03:35 AM    MCV 96.6 11/14/2018 03:35 AM       Lab Results   Component Value Date/Time    AST (SGOT) 23 11/14/2018 03:35 AM    Alk.  phosphatase 93 11/14/2018 03:35 AM    Protein, total 6.9 11/14/2018 03:35 AM    Albumin 2.6 (L) 11/14/2018 03:35 AM    Globulin 4.3 (H) 11/14/2018 03:35 AM       No results found for: IRON, FE, TIBC, IBCT, PSAT, FERR    No results found for: SR, CRP, JULIET, ANAIGG, RA, RPR, RPRT, VDRLT, VDRLS, TSH, TSHEXT     No results found for: PH, PHI, PCO2, PCO2I, PO2, PO2I, HCO3, HCO3I, FIO2, FIO2I    Lab Results   Component Value Date/Time    Troponin-I, Qt. <0.05 11/13/2018 09:13 AM        Lab Results   Component Value Date/Time    Culture result: NO GROWTH AFTER 22 HOURS 11/13/2018 09:13 AM    Culture result: MIXED UROGENITAL MARIAM ISOLATED 01/07/2016 03:20 PM       No results found for: TOXA1, RPR, HBCM, HBSAG, HAAB, HCAB1, HAAT, G6PD, CRYAC, HIVGT, HIVR, HIV1, HIV12, HIVPC, HIVRPI    No results found for: VANCT, CPK    Lab Results   Component Value Date/Time    Color YELLOW/STRAW 01/07/2016 03:30 PM    Appearance CLEAR 01/07/2016 03:30 PM    Specific gravity 1.030 01/07/2016 03:30 PM    pH (UA) 5.5 01/07/2016 03:30 PM    Protein TRACE (A) 01/07/2016 03:30 PM    Glucose >1,000 (A) 01/07/2016 03:30 PM    Ketone TRACE (A) 01/07/2016 03:30 PM    Bilirubin NEGATIVE  08/28/2013 12:30 PM    Blood NEGATIVE  01/07/2016 03:30 PM    Urobilinogen 1.0 01/07/2016 03:30 PM    Nitrites NEGATIVE  01/07/2016 03:30 PM    Leukocyte Esterase NEGATIVE  01/07/2016 03:30 PM    WBC 0-4 01/07/2016 03:30 PM    RBC 0-5 01/07/2016 03:30 PM    Bacteria 1+ (A) 01/07/2016 03:30 PM       IMPRESSION  · Acute (on Chronic?) Respiratory Failure  · KAUSHAL Pneumonia  · Progressive: with potential for asthma vs.COPD  · DM  · Hypertension  · Obesity  · KOMAL; compliant with CPAP    PLAN  · Supplemental O2 to keep sats >90%. Already has home O2 arranged per primary team.  · Continue Levofloxacin for a total of 7 days of therapy  · Continue scheduled Duo-Nebs  · Needs outpatient pulmonary follow-up with  with PFTs  · CPAP at night and with naps  · Follow-up with remainder of PNA panel  · DVT Prophylaxis:  Lovenox    Thank you very much for the consult.     Adriana Roldan NP

## 2018-11-15 NOTE — DISCHARGE INSTRUCTIONS
Patient Discharge Instructions    Rufino Schaumann / [id number] : 1945    Admitted 2018 Discharged: 11/15/2018     Primary Diagnoses  Problem List as of 11/15/2018 Date Reviewed: 2013          Codes Class Noted - Resolved    SOB (shortness of breath) ICD-10-CM: R06.02  ICD-9-CM: 786.05  2018 - Present        Anxiety with depression ICD-10-CM: F41.8  ICD-9-CM: 300.4  2018 - Present        * (Principal) Community acquired pneumonia of left upper lobe of lung ([facility] ICD-10-CM: J18.1  ICD-9-CM: 399  2018 - Present        HTN (hypertension), benign ICD-10-CM: I10  ICD-9-CM: 401.1  2013 - Present        Type II or unspecified type diabetes mellitus with neurological manifestations, uncontrolled(250.62) ([facility] ICD-10-CM: E11.49  ICD-9-CM: 250.62  10/19/2012 - Present        Esophageal reflux ICD-10-CM: K21.9  ICD-9-CM: 530.81  10/19/2012 - Present        Hyperlipidemia LDL goal < 100 ICD-10-CM: E78.5  ICD-9-CM: 272.4  2012 - Present        KOMAL on CPAP ICD-10-CM: G47.33, Z99.89  ICD-9-CM: 327.23, V46.8  2012 - Present        Obesity ICD-10-CM: E66.9  ICD-9-CM: 278.00  2012 - Present        RESOLVED: Type II or unspecified type diabetes mellitus with hyperosmolarity, not stated as uncontrolled ICD-10-CM: E11.01  ICD-9-CM: 250.20  2012 - 10/19/2012        RESOLVED: Type II or unspecified type diabetes mellitus without mention of complication, uncontrolled ICD-10-CM: E11.65  ICD-9-CM: 250.02  2012 - 10/19/2012              Take Home Medications       · It is important that you take the medication exactly as they are prescribed. · Keep your medication in the bottles provided by the pharmacist and keep a list of the medication names, dosages, and times to be taken in your wallet. · Do not take other medications without consulting your doctor.        What to do at [address]: Resume previous diet  Recommended activity: Activity as tolerated    If you experience worsening symptoms, please follow up with nearest ER. Follow-up with your PCP in 1 week        Information obtained by :  I understand that if any problems occur once I am at home I am to contact my physician. I understand and acknowledge receipt of the instructions indicated above. [de-identified] or R.N.'s Signature                                                                  Date/Time                                                                                                                                              Patient or Representative Signature                                                          Date/Time       Learning About COPD and How to Prevent Lung Infections  How do lung infections affect COPD? Lung infections like pneumonia and acute bronchitis are common causes of COPD flare-ups. And people who have COPD are more likely to get these lung infections, especially if they smoke. When you have COPD, it is important to know the symptoms of pneumonia and acute bronchitis and call your doctor if you have them. Symptoms include:  · A cough that brings up more mucus than usual.  · Fever. · Shortness of breath. What can you do to prevent these infections? Stay healthy  · Get a flu shot every year. · Get a pneumococcal vaccine shot. If you have had one before, ask your doctor whether you need another dose. Two different types of pneumococcal vaccines are recommended for people ages 72 and older. · If you must be around people with colds or the flu, wash your hands often. · Do not smoke. This is the most important step you can take to prevent more damage to your lungs. If you need help quitting, talk to your doctor about stop-smoking programs and medicines. These can increase your chances of quitting for good.   · Avoid secondhand smoke, air pollution, and high altitudes. Also avoid cold, dry air and hot, humid air. Stay at home with your windows closed when air pollution is bad. Exercise and eat well  · If your doctor recommends it, get more exercise. Walking is a good choice. Bit by bit, increase the amount you walk every day. Try for at least 30 minutes on most days of the week. · Eat regular, well-balanced meals. Eating right keeps your energy levels up and helps your body fight infection. · Get plenty of rest and sleep. Follow-up care is a key part of your treatment and safety. Be sure to make and go to all appointments, and call your doctor if you are having problems. It's also a good idea to know your test results and keep a list of the medicines you take. Where can you learn more? Go to http://mark-nicole.info/. Enter V868 in the search box to learn more about \"Learning About COPD and How to Prevent Lung Infections. \"  Current as of: December 6, 2017  Content Version: 11.8  © 9515-2437 [employer]. Care instructions adapted under license by [employer] (which disclaims liability or warranty for this information). If you have questions about a medical condition or this instruction, always ask your healthcare professional. [address] any warranty or liability for your use of this information.

## 2018-11-20 LAB
BACTERIA SPEC CULT: NORMAL
SERVICE CMNT-IMP: NORMAL

## 2019-01-08 ENCOUNTER — HOSPITAL ENCOUNTER (OUTPATIENT)
Dept: CT IMAGING | Age: 74
Discharge: HOME | End: 2019-01-08
Attending: NURSE PRACTITIONER
Payer: MEDICARE

## 2019-01-08 DIAGNOSIS — J18.9 PNEUMONIA: ICD-10-CM

## 2019-01-08 PROCEDURE — 71250 CT THORAX DX C-: CPT

## 2019-01-19 ENCOUNTER — APPOINTMENT (OUTPATIENT)
Dept: GENERAL RADIOLOGY | Age: 74
End: 2019-01-19
Attending: EMERGENCY MEDICINE
Payer: MEDICARE

## 2019-01-19 ENCOUNTER — HOSPITAL ENCOUNTER (EMERGENCY)
Age: 74
Discharge: HOME | End: 2019-01-19
Attending: EMERGENCY MEDICINE
Payer: MEDICARE

## 2019-01-19 VITALS
OXYGEN SATURATION: 91 % | RESPIRATION RATE: 18 BRPM | WEIGHT: 246 LBS | BODY MASS INDEX: 40.98 KG/M2 | HEIGHT: 65 IN | TEMPERATURE: 98.7 F | DIASTOLIC BLOOD PRESSURE: 74 MMHG | HEART RATE: 88 BPM | SYSTOLIC BLOOD PRESSURE: 166 MMHG

## 2019-01-19 DIAGNOSIS — R07.89 CHEST WALL PAIN: ICD-10-CM

## 2019-01-19 DIAGNOSIS — R06.02 SOB (SHORTNESS OF BREATH): Primary | ICD-10-CM

## 2019-01-19 LAB
ALBUMIN SERPL-MCNC: 3.2 G/DL (ref 3.5–5)
ALBUMIN/GLOB SERPL: 0.7 {RATIO} (ref 1.1–2.2)
ALP SERPL-CCNC: 111 U/L (ref 45–117)
ALT SERPL-CCNC: 17 U/L (ref 12–78)
ANION GAP SERPL CALC-SCNC: 8 MMOL/L (ref 5–15)
AST SERPL-CCNC: 16 U/L (ref 15–37)
BASOPHILS # BLD: 0.1 K/UL (ref 0–0.1)
BASOPHILS NFR BLD: 1 % (ref 0–1)
BILIRUB SERPL-MCNC: 0.2 MG/DL (ref 0.2–1)
BNP SERPL-MCNC: 56 PG/ML (ref 0–125)
BUN SERPL-MCNC: 19 MG/DL (ref 6–20)
BUN/CREAT SERPL: 25 (ref 12–20)
CALCIUM SERPL-MCNC: 9.2 MG/DL (ref 8.5–10.1)
CHLORIDE SERPL-SCNC: 105 MMOL/L (ref 97–108)
CO2 SERPL-SCNC: 27 MMOL/L (ref 21–32)
CREAT SERPL-MCNC: 0.76 MG/DL (ref 0.55–1.02)
DIFFERENTIAL METHOD BLD: ABNORMAL
EOSINOPHIL # BLD: 0.1 K/UL (ref 0–0.4)
EOSINOPHIL NFR BLD: 1 % (ref 0–7)
ERYTHROCYTE [DISTWIDTH] IN BLOOD BY AUTOMATED COUNT: 12.7 % (ref 11.5–14.5)
GLOBULIN SER CALC-MCNC: 4.3 G/DL (ref 2–4)
GLUCOSE SERPL-MCNC: 297 MG/DL (ref 65–100)
HCT VFR BLD AUTO: 40 % (ref 35–47)
HGB BLD-MCNC: 12.8 G/DL (ref 11.5–16)
IMM GRANULOCYTES # BLD AUTO: 0.1 K/UL (ref 0–0.04)
IMM GRANULOCYTES NFR BLD AUTO: 1 % (ref 0–0.5)
LYMPHOCYTES # BLD: 3 K/UL (ref 0.8–3.5)
LYMPHOCYTES NFR BLD: 30 % (ref 12–49)
MCH RBC QN AUTO: 30.1 PG (ref 26–34)
MCHC RBC AUTO-ENTMCNC: 32 G/DL (ref 30–36.5)
MCV RBC AUTO: 94.1 FL (ref 80–99)
MONOCYTES # BLD: 0.9 K/UL (ref 0–1)
MONOCYTES NFR BLD: 9 % (ref 5–13)
NEUTS SEG # BLD: 5.9 K/UL (ref 1.8–8)
NEUTS SEG NFR BLD: 60 % (ref 32–75)
NRBC # BLD: 0 K/UL (ref 0–0.01)
NRBC BLD-RTO: 0 PER 100 WBC
PLATELET # BLD AUTO: 200 K/UL (ref 150–400)
PMV BLD AUTO: 11 FL (ref 8.9–12.9)
POTASSIUM SERPL-SCNC: 4.4 MMOL/L (ref 3.5–5.1)
PROT SERPL-MCNC: 7.5 G/DL (ref 6.4–8.2)
RBC # BLD AUTO: 4.25 M/UL (ref 3.8–5.2)
SODIUM SERPL-SCNC: 140 MMOL/L (ref 136–145)
TROPONIN I SERPL-MCNC: <0.05 NG/ML
WBC # BLD AUTO: 9.9 K/UL (ref 3.6–11)

## 2019-01-19 PROCEDURE — 83880 ASSAY OF NATRIURETIC PEPTIDE: CPT

## 2019-01-19 PROCEDURE — 84484 ASSAY OF TROPONIN QUANT: CPT

## 2019-01-19 PROCEDURE — 74011250637 HC RX REV CODE- 250/637: Performed by: EMERGENCY MEDICINE

## 2019-01-19 PROCEDURE — 99282 EMERGENCY DEPT VISIT SF MDM: CPT

## 2019-01-19 PROCEDURE — 71046 X-RAY EXAM CHEST 2 VIEWS: CPT

## 2019-01-19 PROCEDURE — 80053 COMPREHEN METABOLIC PANEL: CPT

## 2019-01-19 PROCEDURE — 36415 COLL VENOUS BLD VENIPUNCTURE: CPT

## 2019-01-19 PROCEDURE — 93005 ELECTROCARDIOGRAM TRACING: CPT

## 2019-01-19 PROCEDURE — 74011250636 HC RX REV CODE- 250/636: Performed by: EMERGENCY MEDICINE

## 2019-01-19 PROCEDURE — 85025 COMPLETE CBC W/AUTO DIFF WBC: CPT

## 2019-01-19 PROCEDURE — 96374 THER/PROPH/DIAG INJ IV PUSH: CPT

## 2019-01-19 RX ORDER — KETOROLAC TROMETHAMINE 30 MG/ML
15 INJECTION, SOLUTION INTRAMUSCULAR; INTRAVENOUS
Status: COMPLETED | OUTPATIENT
Start: 2019-01-19 | End: 2019-01-19

## 2019-01-19 RX ORDER — ACETAMINOPHEN 500 MG
1000 TABLET ORAL
Status: COMPLETED | OUTPATIENT
Start: 2019-01-19 | End: 2019-01-19

## 2019-01-19 RX ORDER — NAPROXEN 500 MG/1
500 TABLET ORAL 2 TIMES DAILY WITH MEALS
Qty: 10 TAB | Refills: 0 | Status: SHIPPED | OUTPATIENT
Start: 2019-01-19 | End: 2019-01-24

## 2019-01-19 RX ADMIN — KETOROLAC TROMETHAMINE 15 MG: 30 INJECTION, SOLUTION INTRAMUSCULAR at 16:43

## 2019-01-19 RX ADMIN — ACETAMINOPHEN 1000 MG: 500 TABLET ORAL at 16:43

## 2019-01-19 NOTE — ED PROVIDER NOTES
3:34PM 
Pt presents ambulatory to ED w/ cc of SOB. Pt reports intermittent chest pain (OS 1/18/19) and dyspnea. Pt states that she feels like she \"still has Pneumonia\", and reports being hospitalized on 11/13/18 for a week due to the illness. Pt denies hx of PE. 
 
72 y.o. female with past medical history significant for DM, arthritis, chronic pain, nausea & vomiting, sleep apnea, anxiety, depression, and kidney mass who presents from private vehicle with chief complaint of shoulder pain. Pt reports right shoulder pain for 1 day. Pt also c/o SOB and leg swelling. Pt reports taking aleve ID, last dose 3 hours ago. Pt states she had a CT scan on 01/08/19, which revealed resolved pneumonia. Pt notes she was diagnosed with pneumonia on 11/13/18. Pt denies taking any tylenol today. Pt denies history of blood clots. Pt denies recent travel or surgery. Pt denies abdominal pain or any pain currently. There are no other acute medical concerns at this time. PCP: Brittany Montes MD 
 
Note written by Vernell Walls, as dictated by Ernie Sierra MD 4:00 PM 
 
 
 
The history is provided by the patient. No  was used. Past Medical History:  
Diagnosis Date  Anxiety and depression   
 anxiety/depression  Arthritis   
 osteo  Chronic pain   
 knee pain,jointpain,muscle pain, Back pain  DM (diabetes mellitus) ([facility]  Kidney mass   
 mass on kidney  Nausea & vomiting  Sleep apnea Past Surgical History:  
Procedure Laterality Date  BREAST SURGERY PROCEDURE UNLISTED    
 breast biopsy  HX OTHER SURGICAL    
 left foot surgery. 18srews, 3plates Family History:  
Problem Relation Age of Onset  Heart Disease Mother  Stroke Mother  Hypertension Father  Diabetes Sister Social History Socioeconomic History  Marital status: SINGLE Spouse name: Not on file  Number of children: Not on file  Years of education: Not on file  Highest education level: Not on file Social Needs  Financial resource strain: Not on file  Food insecurity - worry: Not on file  Food insecurity - inability: Not on file  Transportation needs - medical: Not on file  Transportation needs - non-medical: Not on file Occupational History  Not on file Tobacco Use  Smoking status: Former Smoker Packs/day: 2.00 Years: 25.00 Pack years: 50.00 Last attempt to quit: 2002 Years since quittin.1  Smokeless tobacco: Never Used Substance and Sexual Activity  Alcohol use: No  
  Alcohol/week: 0.6 oz Types: 1 Cans of beer per week  Drug use: No  
 Sexual activity: No  
Other Topics Concern  Not on file Social History Narrative  Not on file ALLERGIES: Byetta [exenatide] and Metformin Review of Systems Constitutional: Negative for fever. HENT: Negative for facial swelling. Eyes: Negative for visual disturbance. Respiratory: Positive for shortness of breath. Negative for chest tightness. Cardiovascular: Positive for leg swelling. Gastrointestinal: Negative for abdominal pain. Genitourinary: Negative for difficulty urinating and dysuria. Musculoskeletal: Negative for arthralgias.  
     + Right shoulder pain. Skin: Negative for rash. Neurological: Negative for dizziness. Hematological: Negative for adenopathy. Psychiatric/Behavioral: Negative for suicidal ideas. All other systems reviewed and are negative. Vitals:  
 19 1534 Pulse: 94 Resp: 20 Temp: 98.7 °F (37.1 °C) SpO2: 95% Weight: 111.6 kg (246 lb) Height: 5' 5\" (1.651 m) Physical Exam  
Constitutional: She appears well-developed and well-nourished. No distress. HENT:  
Head: Normocephalic and atraumatic. Eyes: Conjunctivae are normal.  
Neck: Neck supple. Cardiovascular: Normal rate and regular rhythm. Pulmonary/Chest: Effort normal. No respiratory distress. Abdominal: She exhibits no distension. Musculoskeletal: Normal range of motion. She exhibits no deformity. Right lower leg: She exhibits edema. Left lower leg: She exhibits edema. 2+ bilateral pitting edema. Neurological: She is alert. No cranial nerve deficit. Skin: Skin is warm and dry. Psychiatric: Her behavior is normal.  
Nursing note and vitals reviewed. Note written by Vernell Zhang, as dictated by Gallito Blankenship MD 4:00 PM 
 
 
MDM 
  
72 y.o. female presents with likely chest wall pain in right upper chest. No s/s of PE. Had prior pneumonia on left, doubt pleurisy or complication of infection with persistent resolution of airspace disease on CXR. Labs reassuring. Patient was recommended to take short course of scheduled NSAIDs and engage in early mobility as definitive treatment. Plan to follow up with PCP as needed and return precautions discussed for worsening or new concerning symptoms. Procedures

## 2019-01-19 NOTE — DISCHARGE INSTRUCTIONS
Patient Education        Chest Pain: Care Instructions  Your Care Instructions    There are many things that can cause chest pain. Some are not serious and will get better on their own in a few days. But some kinds of chest pain need more testing and treatment. Your doctor may have recommended a follow-up visit in the next 8 to 12 hours. If you are not getting better, you may need more tests or treatment. Even though your doctor has released you, you still need to watch for any problems. The doctor carefully checked you, but sometimes problems can develop later. If you have new symptoms or if your symptoms do not get better, get medical care right away. If you have worse or different chest pain or pressure that lasts more than 5 minutes or you passed out (lost consciousness), call 911 or seek other emergency help right away. A medical visit is only one step in your treatment. Even if you feel better, you still need to do what your doctor recommends, such as going to all suggested follow-up appointments and taking medicines exactly as directed. This will help you recover and help prevent future problems. How can you care for yourself at home? · Rest until you feel better. · Take your medicine exactly as prescribed. Call your doctor if you think you are having a problem with your medicine. · Do not drive after taking a prescription pain medicine. When should you call for help? Call 911 if:    · You passed out (lost consciousness).     · You have severe difficulty breathing.     · You have symptoms of a heart attack. These may include:  ? Chest pain or pressure, or a strange feeling in your chest.  ? Sweating. ? Shortness of breath. ? Nausea or vomiting. ? Pain, pressure, or a strange feeling in your back, neck, jaw, or upper belly or in one or both shoulders or arms. ? Lightheadedness or sudden weakness. ? A fast or irregular heartbeat.   After you call 911, the  may tell you to chew 1 adult-strength or 2 to 4 low-dose aspirin. Wait for an ambulance. Do not try to drive yourself.    Call your doctor today if:    · You have any trouble breathing.     · Your chest pain gets worse.     · You are dizzy or lightheaded, or you feel like you may faint.     · You are not getting better as expected.     · You are having new or different chest pain. Where can you learn more? Go to http://mark-nicole.info/. Enter A120 in the search box to learn more about \"Chest Pain: Care Instructions. \"  Current as of: September 23, 2018  Content Version: 11.9  © 4772-4221 [employer]. Care instructions adapted under license by [employer] (which disclaims liability or warranty for this information). If you have questions about a medical condition or this instruction, always ask your healthcare professional. [place] [address] any warranty or liability for your use of this information. Patient Education        Shortness of Breath: Care Instructions  Your Care Instructions  Shortness of breath has many causes. Sometimes conditions such as anxiety can lead to shortness of breath. Some people get mild shortness of breath when they exercise. Trouble breathing also can be a symptom of a serious problem, such as asthma, lung disease, emphysema, heart problems, and pneumonia. If your shortness of breath continues, you may need tests and treatment. Watch for any changes in your breathing and other symptoms. Follow-up care is a key part of your treatment and safety. Be sure to make and go to all appointments, and call your doctor if you are having problems. It's also a good idea to know your test results and keep a list of the medicines you take. How can you care for yourself at home? · Do not smoke or allow others to smoke around you. If you need help quitting, talk to your doctor about stop-smoking programs and medicines.  These can increase your chances of quitting for good. · Get plenty of rest and sleep. · Take your medicines exactly as prescribed. Call your doctor if you think you are having a problem with your medicine. · Find healthy ways to deal with stress. ? Exercise daily. ? Get plenty of sleep. ? Eat regularly and well. When should you call for help? Call 911 anytime you think you may need emergency care. For example, call if:    · You have severe shortness of breath.     · You have symptoms of a heart attack. These may include:  ? Chest pain or pressure, or a strange feeling in the chest.  ? Sweating. ? Shortness of breath. ? Nausea or vomiting. ? Pain, pressure, or a strange feeling in the back, neck, jaw, or upper belly or in one or both shoulders or arms. ? Lightheadedness or sudden weakness. ? A fast or irregular heartbeat. After you call 911, the  may tell you to chew 1 adult-strength or 2 to 4 low-dose aspirin. Wait for an ambulance. Do not try to drive yourself.    Call your doctor now or seek immediate medical care if:    · Your shortness of breath gets worse or you start to wheeze. Wheezing is a high-pitched sound when you breathe.     · You wake up at night out of breath or have to prop your head up on several pillows to breathe.     · You are short of breath after only light activity or while at rest.    Watch closely for changes in your health, and be sure to contact your doctor if:    · You do not get better over the next 1 to 2 days. Where can you learn more? Go to http://mark-nicole.info/. Enter S780 in the search box to learn more about \"Shortness of Breath: Care Instructions. \"  Current as of: September 5, 2018  Content Version: 11.9  © 3058-2331 [employer]. Care instructions adapted under license by [employer] (which disclaims liability or warranty for this information).  If you have questions about a medical condition or this instruction, always ask your healthcare professional. [address] any warranty or liability for your use of this information.

## 2019-01-20 LAB
ATRIAL RATE: 95 BPM
CALCULATED P AXIS, ECG09: 55 DEGREES
CALCULATED R AXIS, ECG10: 28 DEGREES
CALCULATED T AXIS, ECG11: 68 DEGREES
DIAGNOSIS, 93000: NORMAL
P-R INTERVAL, ECG05: 144 MS
Q-T INTERVAL, ECG07: 354 MS
QRS DURATION, ECG06: 80 MS
QTC CALCULATION (BEZET), ECG08: 444 MS
VENTRICULAR RATE, ECG03: 95 BPM

## 2020-01-05 ENCOUNTER — APPOINTMENT (OUTPATIENT)
Dept: GENERAL RADIOLOGY | Age: 75
End: 2020-01-05
Attending: PHYSICIAN ASSISTANT
Payer: MEDICARE

## 2020-01-05 ENCOUNTER — HOSPITAL ENCOUNTER (EMERGENCY)
Age: 75
Discharge: HOME | End: 2020-01-05
Attending: EMERGENCY MEDICINE
Payer: MEDICARE

## 2020-01-05 VITALS
HEART RATE: 75 BPM | SYSTOLIC BLOOD PRESSURE: 168 MMHG | OXYGEN SATURATION: 92 % | TEMPERATURE: 98.5 F | HEIGHT: 64 IN | BODY MASS INDEX: 40.46 KG/M2 | WEIGHT: 237 LBS | DIASTOLIC BLOOD PRESSURE: 77 MMHG | RESPIRATION RATE: 16 BRPM

## 2020-01-05 DIAGNOSIS — J20.9 ACUTE BRONCHITIS, UNSPECIFIED ORGANISM: Primary | ICD-10-CM

## 2020-01-05 LAB
ALBUMIN SERPL-MCNC: 3.3 G/DL (ref 3.5–5)
ALBUMIN/GLOB SERPL: 0.7 {RATIO} (ref 1.1–2.2)
ALP SERPL-CCNC: 90 U/L (ref 45–117)
ALT SERPL-CCNC: 24 U/L (ref 12–78)
ANION GAP SERPL CALC-SCNC: 5 MMOL/L (ref 5–15)
AST SERPL-CCNC: 26 U/L (ref 15–37)
BASOPHILS # BLD: 0.1 K/UL (ref 0–0.1)
BASOPHILS NFR BLD: 1 % (ref 0–1)
BILIRUB SERPL-MCNC: 0.4 MG/DL (ref 0.2–1)
BUN SERPL-MCNC: 16 MG/DL (ref 6–20)
BUN/CREAT SERPL: 18 (ref 12–20)
CALCIUM SERPL-MCNC: 9 MG/DL (ref 8.5–10.1)
CHLORIDE SERPL-SCNC: 108 MMOL/L (ref 97–108)
CO2 SERPL-SCNC: 26 MMOL/L (ref 21–32)
CREAT SERPL-MCNC: 0.91 MG/DL (ref 0.55–1.02)
DIFFERENTIAL METHOD BLD: NORMAL
EOSINOPHIL # BLD: 0.4 K/UL (ref 0–0.4)
EOSINOPHIL NFR BLD: 3 % (ref 0–7)
ERYTHROCYTE [DISTWIDTH] IN BLOOD BY AUTOMATED COUNT: 13.6 % (ref 11.5–14.5)
GLOBULIN SER CALC-MCNC: 4.6 G/DL (ref 2–4)
GLUCOSE SERPL-MCNC: 196 MG/DL (ref 65–100)
HCT VFR BLD AUTO: 44.9 % (ref 35–47)
HGB BLD-MCNC: 14.3 G/DL (ref 11.5–16)
IMM GRANULOCYTES # BLD AUTO: 0 K/UL (ref 0–0.04)
IMM GRANULOCYTES NFR BLD AUTO: 0 % (ref 0–0.5)
LYMPHOCYTES # BLD: 2.7 K/UL (ref 0.8–3.5)
LYMPHOCYTES NFR BLD: 26 % (ref 12–49)
MCH RBC QN AUTO: 29.8 PG (ref 26–34)
MCHC RBC AUTO-ENTMCNC: 31.8 G/DL (ref 30–36.5)
MCV RBC AUTO: 93.5 FL (ref 80–99)
MONOCYTES # BLD: 0.8 K/UL (ref 0–1)
MONOCYTES NFR BLD: 8 % (ref 5–13)
NEUTS SEG # BLD: 6.4 K/UL (ref 1.8–8)
NEUTS SEG NFR BLD: 62 % (ref 32–75)
NRBC # BLD: 0 K/UL (ref 0–0.01)
NRBC BLD-RTO: 0 PER 100 WBC
PLATELET # BLD AUTO: 220 K/UL (ref 150–400)
PMV BLD AUTO: 11 FL (ref 8.9–12.9)
POTASSIUM SERPL-SCNC: 4.8 MMOL/L (ref 3.5–5.1)
PROT SERPL-MCNC: 7.9 G/DL (ref 6.4–8.2)
RBC # BLD AUTO: 4.8 M/UL (ref 3.8–5.2)
SODIUM SERPL-SCNC: 139 MMOL/L (ref 136–145)
TROPONIN I SERPL-MCNC: <0.05 NG/ML
WBC # BLD AUTO: 10.4 K/UL (ref 3.6–11)

## 2020-01-05 PROCEDURE — 85025 COMPLETE CBC W/AUTO DIFF WBC: CPT

## 2020-01-05 PROCEDURE — 93005 ELECTROCARDIOGRAM TRACING: CPT

## 2020-01-05 PROCEDURE — 99281 EMR DPT VST MAYX REQ PHY/QHP: CPT

## 2020-01-05 PROCEDURE — 36415 COLL VENOUS BLD VENIPUNCTURE: CPT

## 2020-01-05 PROCEDURE — 84484 ASSAY OF TROPONIN QUANT: CPT

## 2020-01-05 PROCEDURE — 80053 COMPREHEN METABOLIC PANEL: CPT

## 2020-01-05 PROCEDURE — 71046 X-RAY EXAM CHEST 2 VIEWS: CPT

## 2020-01-05 PROCEDURE — 74011000250 HC RX REV CODE- 250: Performed by: PHYSICIAN ASSISTANT

## 2020-01-05 RX ORDER — LIDOCAINE 4 G/100G
1 PATCH TOPICAL
Status: DISCONTINUED | OUTPATIENT
Start: 2020-01-05 | End: 2020-01-05 | Stop reason: HOSPADM

## 2020-01-05 NOTE — DISCHARGE INSTRUCTIONS
Patient Education        Bronchitis: Care Instructions  Your Care Instructions    Bronchitis is inflammation of the bronchial tubes, which carry air to the lungs. The tubes swell and produce mucus, or phlegm. The mucus and inflamed bronchial tubes make you cough. You may have trouble breathing. Most cases of bronchitis are caused by viruses like those that cause colds. Antibiotics usually do not help and they may be harmful. Bronchitis usually develops rapidly and lasts about 2 to 3 weeks in otherwise healthy people. Follow-up care is a key part of your treatment and safety. Be sure to make and go to all appointments, and call your doctor if you are having problems. It's also a good idea to know your test results and keep a list of the medicines you take. How can you care for yourself at home? · Take all medicines exactly as prescribed. Call your doctor if you think you are having a problem with your medicine. · Get some extra rest.  · Take an over-the-counter pain medicine, such as acetaminophen (Tylenol), ibuprofen (Advil, Motrin), or naproxen (Aleve) to reduce fever and relieve body aches. Read and follow all instructions on the label. · Do not take two or more pain medicines at the same time unless the doctor told you to. Many pain medicines have acetaminophen, which is Tylenol. Too much acetaminophen (Tylenol) can be harmful. · Take an over-the-counter cough medicine that contains dextromethorphan to help quiet a dry, hacking cough so that you can sleep. Avoid cough medicines that have more than one active ingredient. Read and follow all instructions on the label. · Breathe moist air from a humidifier, hot shower, or sink filled with hot water. The heat and moisture will thin mucus so you can cough it out. · Do not smoke. Smoking can make bronchitis worse. If you need help quitting, talk to your doctor about stop-smoking programs and medicines.  These can increase your chances of quitting for good.  When should you call for help? Call 911 anytime you think you may need emergency care. For example, call if:    · You have severe trouble breathing.    Call your doctor now or seek immediate medical care if:    · You have new or worse trouble breathing.     · You cough up dark brown or bloody mucus (sputum).     · You have a new or higher fever.     · You have a new rash.    Watch closely for changes in your health, and be sure to contact your doctor if:    · You cough more deeply or more often, especially if you notice more mucus or a change in the color of your mucus.     · You are not getting better as expected. Where can you learn more? Go to http://mark-nicole.info/. Enter H333 in the search box to learn more about \"Bronchitis: Care Instructions. \"  Current as of: June 9, 2019  Content Version: 12.2  © 4987-2456 [employer]. Care instructions adapted under license by [employer] (which disclaims liability or warranty for this information). If you have questions about a medical condition or this instruction, always ask your healthcare professional. [address] any warranty or liability for your use of this information.

## 2020-01-05 NOTE — ED PROVIDER NOTES
69 y.o. female with past medical history significant for DM and COPD, CABG who presents from home with chief complaint of productive cough, upper back pain and chest soreness with coughing. Patient states onset of \"chest soreness\" was ~1 week ago. Patient endorses accompanying back pain between her shoulders, bilateral ear pain and fullness, cough producing yellow phlegm, and congestion. Patient was seen ~2 days ago at [facility] and was discharged with Augmentin. Patient presents to [facility] ED for evaluation of persisting chest soreness and cough and concern for possible pneumonia. . Patient does have COPD so at baseline she is mildly SOB and this is unchanged for her. Patient denies leg swelling, fever. There are no other acute medical concerns at this time. Social hx: Former Smoker, No EtOH use, No illicit drug use. PCP: Griselda Palmer MD    Note written by Vernell Womack, as dictated by DAVID Camarillo 12:20 PM     The history is provided by the patient. No  was used. Past Medical History:   Diagnosis Date    Anxiety and depression     anxiety/depression    Arthritis     osteo    Chronic pain     knee pain,jointpain,muscle pain, Back pain    DM (diabetes mellitus) ([address]     Kidney mass     mass on kidney    Nausea & vomiting     Sleep apnea        Past Surgical History:   Procedure Laterality Date    BREAST SURGERY PROCEDURE UNLISTED      breast biopsy    HX OTHER SURGICAL      left foot surgery.  18srews, 3plates         Family History:   Problem Relation Age of Onset    Heart Disease Mother     Stroke Mother     Hypertension Father     Diabetes Sister        Social History     Socioeconomic History    Marital status: SINGLE     Spouse name: Not on file    Number of children: Not on file    Years of education: Not on file    Highest education level: Not on file   Occupational History    Not on file   Social Needs    Financial resource strain: Not on file    Food insecurity:     Worry: Not on file     Inability: Not on file    Transportation needs:     Medical: Not on file     Non-medical: Not on file   Tobacco Use    Smoking status: Former Smoker     Packs/day: 2.00     Years: 25.00     Pack years: 50.00     Last attempt to quit: 2002     Years since quittin.1    Smokeless tobacco: Never Used   Substance and Sexual Activity    Alcohol use: No     Alcohol/week: 1.0 standard drinks     Types: 1 Cans of beer per week    Drug use: No    Sexual activity: Never   Lifestyle    Physical activity:     Days per week: Not on file     Minutes per session: Not on file    Stress: Not on file   Relationships    Social connections:     Talks on phone: Not on file     Gets together: Not on file     Attends Caodaism service: Not on file     Active member of club or organization: Not on file     Attends meetings of clubs or organizations: Not on file     Relationship status: Not on file    Intimate partner violence:     Fear of current or ex partner: Not on file     Emotionally abused: Not on file     Physically abused: Not on file     Forced sexual activity: Not on file   Other Topics Concern    Not on file   Social History Narrative    Not on file         ALLERGIES: Byetta [exenatide] and Metformin    Review of Systems   Constitutional: Negative for fever. HENT: Positive for congestion and ear pain. Respiratory: Positive for cough (productive) and chest tightness (\"soreness\"). Cardiovascular: Negative for leg swelling. Genitourinary: Negative for frequency. Musculoskeletal: Positive for back pain. All other systems reviewed and are negative. Vitals:    20 1215   BP: 163/74   Pulse: 75   Resp: 16   Temp: 98.5 °F (36.9 °C)   SpO2: 92%   Weight: 107.5 kg (237 lb)   Height: 5' 4\" (1.626 m)            Physical Exam  Vitals signs and nursing note reviewed. Constitutional:       Appearance: She is well-developed.       Comments: LIS, elevated BMI   HENT:      Head: Normocephalic and atraumatic. Right Ear: Tympanic membrane normal.      Left Ear: Tympanic membrane normal.      Nose: Congestion (nasal) present. Mouth/Throat:      Mouth: Mucous membranes are moist.   Eyes:      Pupils: Pupils are equal, round, and reactive to light. Neck:      Musculoskeletal: Normal range of motion and neck supple. Cardiovascular:      Rate and Rhythm: Normal rate and regular rhythm. Pulses: Normal pulses. Pulmonary:      Effort: Pulmonary effort is normal.      Breath sounds: Normal breath sounds. Abdominal:      General: There is no distension. Palpations: Abdomen is soft. Tenderness: There is no tenderness. Skin:     General: Skin is warm and dry. Capillary Refill: Capillary refill takes less than 2 seconds. Neurological:      Mental Status: She is alert and oriented to person, place, and time. Psychiatric:         Behavior: Behavior normal.         MDM  Number of Diagnoses or Management Options  Acute bronchitis, unspecified organism:   Diagnosis management comments:   Ddx: bronchitis, viral syndrome, PNA, ACS       Amount and/or Complexity of Data Reviewed  Clinical lab tests: ordered and reviewed  Tests in the radiology section of CPT®: ordered and reviewed  Review and summarize past medical records: yes  Discuss the patient with other providers: yes  Independent visualization of images, tracings, or specimens: yes    Patient Progress  Patient progress: stable         Procedures     EKG interpretation:   Rhythm: normal sinus rhythm; and regular . Rate (approx.): 83; Axis: normal; P wave: normal; QRS interval: normal ; ST/T wave: non-specific changes; Other findings: borderline ekg. Interpreted by Dr. Nick Magaña    I discussed patient's PMH, exam findings as well as careplan with the ER attending who agrees with care plan. Manford Buerger, PA-C    BP in both arms unchanged, she is feeling better, has no complaints. EKG unchanged, trop neg, sx's have been present for 1 week. No signs of PNA on CXR. On day 3 of Augmentin. LABORATORY TESTS:  Recent Results (from the past 12 hour(s))   METABOLIC PANEL, COMPREHENSIVE    Collection Time: 01/05/20 12:39 PM   Result Value Ref Range    Sodium 139 136 - 145 mmol/L    Potassium 4.8 3.5 - 5.1 mmol/L    Chloride 108 97 - 108 mmol/L    CO2 26 21 - 32 mmol/L    Anion gap 5 5 - 15 mmol/L    Glucose 196 (H) 65 - 100 mg/dL    BUN 16 6 - 20 MG/DL    Creatinine 0.91 0.55 - 1.02 MG/DL    BUN/Creatinine ratio 18 12 - 20      GFR est AA >60 >60 ml/min/1.73m2    GFR est non-AA >60 >60 ml/min/1.73m2    Calcium 9.0 8.5 - 10.1 MG/DL    Bilirubin, total 0.4 0.2 - 1.0 MG/DL    ALT (SGPT) 24 12 - 78 U/L    AST (SGOT) 26 15 - 37 U/L    Alk. phosphatase 90 45 - 117 U/L    Protein, total 7.9 6.4 - 8.2 g/dL    Albumin 3.3 (L) 3.5 - 5.0 g/dL    Globulin 4.6 (H) 2.0 - 4.0 g/dL    A-G Ratio 0.7 (L) 1.1 - 2.2     TROPONIN I    Collection Time: 01/05/20 12:39 PM   Result Value Ref Range    Troponin-I, Qt. <0.05 <0.05 ng/mL   CBC WITH AUTOMATED DIFF    Collection Time: 01/05/20  1:17 PM   Result Value Ref Range    WBC 10.4 3.6 - 11.0 K/uL    RBC 4.80 3.80 - 5.20 M/uL    HGB 14.3 11.5 - 16.0 g/dL    HCT 44.9 35.0 - 47.0 %    MCV 93.5 80.0 - 99.0 FL    MCH 29.8 26.0 - 34.0 PG    MCHC 31.8 30.0 - 36.5 g/dL    RDW 13.6 11.5 - 14.5 %    PLATELET 787 214 - 402 K/uL    MPV 11.0 8.9 - 12.9 FL    NRBC 0.0 0  WBC    ABSOLUTE NRBC 0.00 0.00 - 0.01 K/uL    NEUTROPHILS 62 32 - 75 %    LYMPHOCYTES 26 12 - 49 %    MONOCYTES 8 5 - 13 %    EOSINOPHILS 3 0 - 7 %    BASOPHILS 1 0 - 1 %    IMMATURE GRANULOCYTES 0 0.0 - 0.5 %    ABS. NEUTROPHILS 6.4 1.8 - 8.0 K/UL    ABS. LYMPHOCYTES 2.7 0.8 - 3.5 K/UL    ABS. MONOCYTES 0.8 0.0 - 1.0 K/UL    ABS. EOSINOPHILS 0.4 0.0 - 0.4 K/UL    ABS. BASOPHILS 0.1 0.0 - 0.1 K/UL    ABS. IMM.  GRANS. 0.0 0.00 - 0.04 K/UL    DF AUTOMATED             DISCHARGE NOTE:  2:52 PM  The patient's results have been reviewed with them and/or available family. Patient and/or family verbally conveyed their understanding and agreement of the patient's signs, symptoms, diagnosis, treatment and prognosis and additionally agree to follow up as recommended in the discharge instructions or to return to the Emergency Room should their condition change prior to their follow-up appointment. The patient/family verbally agrees with the care-plan and verbally conveys that all of their questions have been answered. The discharge instructions have also been provided to the patient and/or family with some educational information regarding the patient's diagnosis as well a list of reasons why the patient would want to return to the ER prior to their follow-up appointment, should their condition change. Plan:  1. F/U with pcp  2. Rx ibuprofen, lidoderm patches; continue Augmentin  3.  Return precautions discussed and advised to return to ER if worse

## 2020-01-05 NOTE — ED TRIAGE NOTES
Pt here for chest soreness, cough, and bilateral ear pain x 1 week. EKG on arrival. Pt seen at [facility] Friday for urinary freq & congestion and given augmentin. Dx'd with congestion. Denies fevers. Pt has COPD, O2 sats 91%. Normal per pt.

## 2020-01-06 LAB
ATRIAL RATE: 83 BPM
CALCULATED P AXIS, ECG09: 57 DEGREES
CALCULATED R AXIS, ECG10: 29 DEGREES
CALCULATED T AXIS, ECG11: 64 DEGREES
DIAGNOSIS, 93000: NORMAL
P-R INTERVAL, ECG05: 154 MS
Q-T INTERVAL, ECG07: 380 MS
QRS DURATION, ECG06: 78 MS
QTC CALCULATION (BEZET), ECG08: 446 MS
VENTRICULAR RATE, ECG03: 83 BPM

## 2020-01-30 ENCOUNTER — OFFICE VISIT (OUTPATIENT)
Dept: NEUROLOGY | Age: 75
End: 2020-01-30

## 2020-01-30 VITALS
BODY MASS INDEX: 41.32 KG/M2 | SYSTOLIC BLOOD PRESSURE: 162 MMHG | HEIGHT: 64 IN | RESPIRATION RATE: 18 BRPM | DIASTOLIC BLOOD PRESSURE: 72 MMHG | HEART RATE: 79 BPM | OXYGEN SATURATION: 93 % | WEIGHT: 242 LBS

## 2020-01-30 DIAGNOSIS — G62.9 NEUROPATHY: ICD-10-CM

## 2020-01-30 DIAGNOSIS — G62.9 NEUROPATHY: Primary | ICD-10-CM

## 2020-01-30 DIAGNOSIS — E66.01 OBESITY, MORBID (HCC): ICD-10-CM

## 2020-01-30 RX ORDER — METOPROLOL TARTRATE 50 MG/1
25 TABLET ORAL DAILY
COMMUNITY
End: 2021-09-20

## 2020-01-30 RX ORDER — PREGABALIN 25 MG/1
CAPSULE ORAL
Qty: 90 CAP | Refills: 3 | Status: SHIPPED | OUTPATIENT
Start: 2020-01-30 | End: 2021-02-11 | Stop reason: ALTCHOICE

## 2020-01-30 RX ORDER — LOSARTAN POTASSIUM 50 MG/1
TABLET ORAL DAILY
COMMUNITY
End: 2020-07-27

## 2020-01-30 NOTE — PATIENT INSTRUCTIONS
Patient history reviewed patient examined. I think this is diabetic neuropathy and we will check some additional labs to see if there is any other contenders. We will set up for EMG and nerve conduction of both legs at the [facility] location. Will issue Lyrica low-dose and can be used as needed. Should not take gabapentin and Lyrica at the same time as they occupy the same nerve receptor. Revisit in about 6 weeks. Keep blood sugars as reasonably tight as possible. Further suggestions could follow.

## 2020-01-30 NOTE — LETTER
1/30/20 Patient: Grayson Reyes YOB: 1945 Date of Visit: 1/30/2020 Charly High MD 
[address] [facility] [address] [place] [address] [id number] VIA Facsimile: [phone] Dear Charly High MD, Thank you for referring Ms. Cash Wiseman to [facility] for evaluation. My notes for this consultation are attached. If you have questions, please do not hesitate to call me. I look forward to following your patient along with you.  
 
 
Sincerely, 
 
Salbador Rojas MD

## 2020-01-30 NOTE — PROGRESS NOTES
Neurology Consult      Subjective:      Hailey Pike is a 69 y.o. female who comes in today with the following history. Says she is an insulin-dependent diabetic for some years(20). Has had fairly persistent pain in the feet that comes and goes in the last 4 to 5 years. She spontaneously offered that she tried CBD oil and that seems to help and it has a burning quality and is more noticeable at the end of the day. Also if she gets a pedicure that seems to help for about 1 day. She tried gabapentin but it made her feel anxious. Says her last hemoglobin A1c was around 8.7, 1-month ago. Has never been officially seen by neurology for what sounds like neuropathy at this point. Has had no falling history. To add misery to the equation she says she has bone-on-bone degenerative disease in both knees. Does say if she closes her eyes she feels uncomfortably unsteady. Fortunately she stopped smoking some years ago. Currently does not have a peripheral claudication history as I know it. 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 Current Outpatient Medications   Medication Sig Dispense Refill    empagliflozin (JARDIANCE) 25 mg tablet Take  by mouth daily.  losartan (COZAAR) 50 mg tablet Take  by mouth daily.  metoprolol tartrate (LOPRESSOR) 50 mg tablet Take 25 mg by mouth two (2) times a day.  pregabalin (LYRICA) 25 mg capsule 1 po tid prn  Indications: diabetic complication causing injury to some body nerves 90 Cap 3    lisinopril (PRINIVIL, ZESTRIL) 20 mg tablet Take 40 mg by mouth daily.  acetaminophen (TYLENOL) 500 mg tablet Take 1,000 mg by mouth two (2) times daily as needed for Pain.  insulin detemir (LEVEMIR FLEXTOUCH) 100 unit/mL (3 mL) inpn 60 Units by SubCUTAneous route ACB/HS.  aspirin delayed-release 81 mg tablet Take 81 mg by mouth daily.       albuterol-ipratropium (DUO-NEB) 2.5 mg-0.5 mg/3 ml nebu 3 mL by Nebulization route every six (6) hours as needed. 30 Nebule 0    guaiFENesin-codeine (ROBITUSSIN AC) 100-10 mg/5 mL solution Take 10 mL by mouth every six (6) hours as needed for Cough. Max Daily Amount: 40 mL. 120 mL 0    arformoterol 15 mcg/2 mL nebu 15 mcg, budesonide 0.5 mg/2 mL nbsp 500 mcg 1 Dose by Nebulization route every twelve (12) hours. 30 Ampule 0    naloxone (NARCAN) 4 mg/actuation nasal spray Use 1 spray intranasally, then discard. Repeat with new spray every 2 min as needed for opioid overdose symptoms, alternating nostrils. 1 Each 0    gabapentin (NEURONTIN) 100 mg capsule Take 100 mg by mouth daily.  insulin lispro (HUMALOG) 100 unit/mL kwikpen by SubCUTAneous route Before breakfast, lunch, and dinner. Sliding Scale      ALPRAZolam (XANAX) 1 mg tablet Take 0.5 mg by mouth nightly as needed for Sleep. Allergies   Allergen Reactions    Byetta [Exenatide] Nausea and Vomiting    Metformin Nausea and Vomiting     Past Medical History:   Diagnosis Date    Anxiety and depression     anxiety/depression    Arthritis     osteo    CAD (coronary artery disease)     Chronic pain     knee pain,jointpain,muscle pain, Back pain    DM (diabetes mellitus) ([facility] [address]     Kidney mass     mass on kidney    Nausea & vomiting     Neuropathy     Sleep apnea       Past Surgical History:   Procedure Laterality Date    BREAST SURGERY PROCEDURE UNLISTED      breast biopsy    CARDIAC SURG PROCEDURE UNLIST      Bypass    HX CHOLECYSTECTOMY      HX OTHER SURGICAL      left foot surgery.  18srews, 3plates      Social History     Socioeconomic History    Marital status: SINGLE     Spouse name: Not on file    Number of children: Not on file    Years of education: Not on file    Highest education level: Not on file   Occupational History    Not on file   Social Needs    Financial resource strain: Not on file    Food insecurity:     Worry: Not on file     Inability: Not on file    Transportation needs:     Medical: Not on file     Non-medical: Not on file   Tobacco Use    Smoking status: Former Smoker     Packs/day: 2.00     Years: 25.00     Pack years: 50.00     Last attempt to quit: 2002     Years since quittin.2    Smokeless tobacco: Never Used   Substance and Sexual Activity    Alcohol use: No     Alcohol/week: 1.0 standard drinks     Types: 1 Cans of beer per week    Drug use: No    Sexual activity: Never   Lifestyle    Physical activity:     Days per week: Not on file     Minutes per session: Not on file    Stress: Not on file   Relationships    Social connections:     Talks on phone: Not on file     Gets together: Not on file     Attends Jain service: Not on file     Active member of club or organization: Not on file     Attends meetings of clubs or organizations: Not on file     Relationship status: Not on file    Intimate partner violence:     Fear of current or ex partner: Not on file     Emotionally abused: Not on file     Physically abused: Not on file     Forced sexual activity: Not on file   Other Topics Concern    Not on file   Social History Narrative    Not on file      Family History   Problem Relation Age of Onset    Heart Disease Mother     Stroke Mother     Hypertension Father     Diabetes Sister       Visit Vitals  /72   Pulse 79   Resp 18   Ht 5' 4\" (1.626 m)   Wt 109.8 kg (242 lb)   SpO2 93%   BMI 41.54 kg/m²        Review of Systems:   A comprehensive review of systems was negative except for that written in the HPI. Neuro Exam:     Appearance: The patient is well developed, well nourished, provides a coherent history and is in no acute distress. Mental Status: Oriented to time, place and person. Mood and affect appropriate. Cranial Nerves:   Intact visual fields. Fundi are benign. ELIECER, EOM's full, no nystagmus, no ptosis. Facial sensation is normal. Corneal reflexes are intact. Facial movement is symmetric. Hearing is normal bilaterally.  Palate is midline with normal sternocleidomastoid and trapezius muscles are normal. Tongue is midline. Motor:  5/5 strength in upper and lower proximal and distal muscles. Normal bulk and tone. No fasciculations. Reflexes:   Deep tendon reflexes 0/4 and symmetrical.   Sensory:    Diminished distally to touch, pinprick and vibration. Position sense intact   Gait:  Normal gait but slow and cautious and Romberg revealed some truncal sway. Tremor:   No tremor noted. Cerebellar:  No cerebellar signs present. Neurovascular:  Normal heart sounds and regular rhythm, peripheral pulses intact, and no carotid bruits. Toes downgoing no clonus and straight leg raising -90 degrees. Assessment:   Painful neuropathy. No doubt diabetic but at least one time she should have a survey of other possible contenders for the diagnosis. Will orchestrate a lab request and quantify neuropathy with EMG and nerve conduction both lower extremities in the [facility] office. We will try Lyrica low-dose as needed to see if this is a better fit for pain containment as opposed to gabapentin. Best containment of blood sugars always recommended. Fortunately stopped smoking some years ago. Further suggestions could follow. Currently thinks CBD oil helps. Plan:   Revisit in about 6 weeks.   Signed by :  Manju Blum MD

## 2020-02-04 ENCOUNTER — OFFICE VISIT (OUTPATIENT)
Dept: NEUROLOGY | Age: 75
End: 2020-02-04

## 2020-02-04 DIAGNOSIS — G62.9 ACQUIRED POLYNEUROPATHY: Primary | ICD-10-CM

## 2020-02-04 NOTE — PROGRESS NOTES
EMG/ NCS Report  DRUG REHABILITATION  - [facility]  [facility]  [facility], [address] [place] [address] [address], [address]   Ph: [phone]   FAX: [phone]/ [phone]  Test Date:  2020    Patient: Kathy Maldonado : 1945 Physician: Hany Lundy MD   Sex: Female Height: ' \" Ref Phys: Yang King IV, MD   ID#: [id number] Weight:  lbs. Technician: Steve Rojas     Patient History / Exam:  CC:DIABETIC NEUROPATHY          EMG & NCV Findings:  Needle evaluation of the right vastus medialis and the left vastus medialis muscles showed diminished recruitment. All remaining muscles (as indicated in the following table) showed no evidence of electrical instability.         Impression:        ___________________________  Yang King IV, MD      Nerve Conduction Studies  Anti Sensory Summary Table     Stim Site NR Peak (ms) Norm Peak (ms) P-T Amp (µV) Norm P-T Amp Site1 Site2 Dist (cm)   Left Sup Fibular Anti Sensory (Lat ankle)  30.7°C   Lower leg NR  <4.6  >4 Lower leg Lat ankle 10.0   Right Sup Fibular Anti Sensory (Lat ankle)  31.6°C   Lower leg NR  <4.6  >4 Lower leg Lat ankle 10.0   Left Sural Anti Sensory (Lat Mall)  31.1°C   Calf NR  <4.5  >4.0 Calf Lat Mall 14.0   Right Sural Anti Sensory (Lat Mall)  31.7°C   Calf NR  <4.5  >4.0 Calf Lat Mall 14.0     Motor Summary Table     Stim Site NR Onset (ms) Norm Onset (ms) O-P Amp (mV) Norm O-P Amp Amp (Prev) (%) Site1 Site2 Dist (cm) Kirk (m/s) Norm Kirk (m/s)   Left Fibular Motor (Ext Dig Brev)  30.5°C   Ankle    4.1 <6.5 1.0 >1.1 100.0 Ankle Ext Dig Brev 8.0     B Fib    10.9  1.0  100.0 B Fib Ankle 30.0 44 >38   Poplt    13.8  0.8  80.0 Poplt B Fib 10.0 34 >42   Right Fibular Motor (Ext Dig Brev)  31.5°C   Ankle    4.0 <6.5 2.2 >1.1 100.0 Ankle Ext Dig Brev 8.0     B Fib    10.3  2.0  90.9 B Fib Ankle 28.0 44 >38   Poplt    12.0  2.2  110.0 Poplt B Fib 10.0 59 >42   Left Fibular TA Motor (Tib Ant)  30.3°C   Fib Head    3.4 <4.5 3.8 >3.0 100.0 Fib Head Tib Ant 10.0     Poplit    4.8  3.5  92.1 Poplit Fib Head 10.0 71 >40   Left Tibial Motor (Abd Mei Brev)  30.4°C   Ankle    4.5 <6.1 4.1 >1.1 100.0 Ankle Abd Mei Brev 8.0     Knee    12.8  2.4  58.5 Knee Ankle 34.0 41 >39   Right Tibial Motor (Abd Mei Brev)  31.4°C   Ankle    4.5 <6.1 6.3 >1.1 100.0 Ankle Abd Mei Brev 8.0     Knee    12.3  6.9  109.5 Knee Ankle 35.0 45 >39     F Wave Studies     NR F-Lat (ms) Lat Norm (ms) L-R F-Lat (ms) L-R Lat Norm   Left Tibial (Mrkrs) (Abd Hallucis)  30.4°C      49.03 <56 0.00 <5.7   Right Tibial (Mrkrs) (Abd Hallucis)  31.3°C      49.03 <56 0.00 <5.7     H Reflex Studies     NR H-Lat (ms) L-R H-Lat (ms) L-R Lat Norm   Left Tibial (Gastroc)  30.3°C      34.14 1.77 <2.0   Right Tibial (Gastroc)  30.8°C      35.91 1.77 <2.0     EMG     Side Muscle Nerve Root Ins Act Fibs Psw Recrt Duration Amp Poly Comment   Right Ext Dig Brev Dp Br Peron L5, S1 Nml Nml Nml Nml Nml Nml Nml    Right AntTibialis Dp Br Peron L4-5 Nml Nml Nml Nml Nml Nml Nml    Right MedGastroc Tibial S1-2 Nml Nml Nml Nml Nml Nml Nml    Right VastusMed Femoral L2-4 Nml Nml Nml Reduced Nml Nml Nml    Right BicepsFemL Sciatic L5-S2 Nml Nml Nml Nml Nml Nml Nml    Left Ext Dig Brev Dp Br Peron L5, S1 Nml Nml Nml Nml Nml Nml Nml    Left AntTibialis Dp Br Peron L4-5 Nml Nml Nml Nml Nml Nml Nml    Left MedGastroc Tibial S1-2 Nml Nml Nml Nml Nml Nml Nml    Left VastusMed Femoral L2-4 Nml Nml Nml Reduced Nml Nml Nml    Left BicepsFemL Sciatic L5-S2 Nml Nml Nml Nml Nml Nml Nml                Nerve Conduction Studies  Anti Sensory Left/Right Comparison     Stim Site L Lat (ms) R Lat (ms) L-R Lat (ms) L Amp (µV) R Amp (µV) L-R Amp (%) Site1 Site2 L Kirk (m/s) R Kirk (m/s) L-R Kirk (m/s)   Sup Fibular Anti Sensory (Lat ankle)  30.7°C   Lower leg       Lower leg Lat ankle      Sural Anti Sensory (Lat Mall)  31.1°C   Calf       Calf Lat Mall        Motor Left/Right Comparison     Stim Site L Lat (ms) R Lat (ms) L-R Lat (ms) L Amp (mV) R Amp (mV) L-R Amp (%) Site1 Site2 L Kirk (m/s) R Kirk (m/s) L-R Kirk (m/s)   Fibular Motor (Ext Dig Brev)  30.5°C   Ankle 4.1 4.0 0.1 1.0 2.2 54.5 Ankle Ext Dig Brev      B Fib 10.9 10.3 0.6 1.0 2.0 50.0 B Fib Ankle 44 44 0   Poplt 13.8 12.0 1.8 0.8 2.2 63.6 Poplt B Fib 34 59 25   Fibular TA Motor (Tib Ant)  30.3°C   Fib Head 3.4   3.8   Fib Head Tib Ant      Poplit 4.8   3.5   Poplit Fib Head 71     Tibial Motor (Abd Mei Brev)  30.4°C   Ankle 4.5 4.5 0.0 4.1 6.3 34.9 Ankle Abd Mei Brev      Knee 12.8 12.3 0.5 2.4 6.9 65.2 Knee Ankle 41 45 4         Waveforms:

## 2020-02-04 NOTE — PROGRESS NOTES
This was an elective EMG and nerve conduction of patient's lower extremities. Based on history and exam primary considerations went to an acquired polyneuropathy and diabetic. Patient history. Patient has insulin-dependent diabetes for over 20 years. For the last 4 to 5 years she has had persistent pains in both feet and her most recent hemoglobin A1c was 8.7. To complicate matters she has advanced bilateral osteoarthropathy in both knees. She says CBD oil seems to help her case. Patient exam.  Alert cooperative articulate and appropriate. Cranial nerves II through XII normal.  Cerebellar testing finger-nose-finger toe to finger intact. Motor 5/5. Reflexes absent. Sensory exam shows length dependent sensory depression in the lower greater than upper extremities. She had a positive Romberg test.      EMG and nerve conduction findings. 1.  Needle insertion and probing was normal.  No evidence of acute denervation or chronic denervation/reinnervation or myopathic potentials. Patient did find the testing uncomfortable but for the most part was able to accomplish what was requested. There was normal motor unit recruitment, except for the vastus medialis muscles, which may have been due to her antalgic posturing with advanced osteoarthritic changes in the knees? Otherwise motor unit number recruitment and sequencing was normal.    2.  The nerve conduction portion was remarkable for unobtainable sensorys for either leg. The coincident tibial  F waves and H reflexes were normal.    Impression: This is an abnormal study that suggests a sensory neuropathy with mostly axonal components, as the information is interpreted. Clinical correlation is advised.   RY CROOKS.

## 2020-02-12 LAB
ALBUMIN SERPL ELPH-MCNC: 3.5 G/DL (ref 2.9–4.4)
ALBUMIN/GLOB SERPL: 1 {RATIO} (ref 0.7–1.7)
ALPHA1 GLOB SERPL ELPH-MCNC: 0.3 G/DL (ref 0–0.4)
ALPHA2 GLOB SERPL ELPH-MCNC: 0.9 G/DL (ref 0.4–1)
ANA SER QL: NEGATIVE
B-GLOBULIN SERPL ELPH-MCNC: 1.4 G/DL (ref 0.7–1.3)
ENA SS-A AB SER-ACNC: <0.2 AI (ref 0–0.9)
ENA SS-B AB SER-ACNC: <0.2 AI (ref 0–0.9)
ERYTHROCYTE [SEDIMENTATION RATE] IN BLOOD BY WESTERGREN METHOD: 24 MM/HR (ref 0–40)
FOLATE SERPL-MCNC: 12.6 NG/ML
GAMMA GLOB SERPL ELPH-MCNC: 1 G/DL (ref 0.4–1.8)
GLOBULIN SER CALC-MCNC: 3.5 G/DL (ref 2.2–3.9)
M PROTEIN SERPL ELPH-MCNC: ABNORMAL G/DL
PLEASE NOTE, 011150: ABNORMAL
PROT SERPL-MCNC: 7 G/DL (ref 6–8.5)
TSH SERPL DL<=0.005 MIU/L-ACNC: 1.41 UIU/ML (ref 0.45–4.5)
VIT B12 SERPL-MCNC: 368 PG/ML (ref 232–1245)

## 2020-03-12 ENCOUNTER — OFFICE VISIT (OUTPATIENT)
Dept: NEUROLOGY | Age: 75
End: 2020-03-12

## 2020-03-12 VITALS
OXYGEN SATURATION: 92 % | BODY MASS INDEX: 41.54 KG/M2 | HEART RATE: 74 BPM | SYSTOLIC BLOOD PRESSURE: 148 MMHG | DIASTOLIC BLOOD PRESSURE: 98 MMHG | HEIGHT: 64 IN

## 2020-03-12 DIAGNOSIS — E11.42 DIABETIC POLYNEUROPATHY ASSOCIATED WITH TYPE 2 DIABETES MELLITUS (HCC): Primary | ICD-10-CM

## 2020-03-12 RX ORDER — AMITRIPTYLINE HYDROCHLORIDE 10 MG/1
10-20 TABLET, FILM COATED ORAL
Qty: 60 TAB | Refills: 5 | Status: SHIPPED | OUTPATIENT
Start: 2020-03-12 | End: 2021-02-11 | Stop reason: ALTCHOICE

## 2020-03-12 NOTE — PROGRESS NOTES
Twyla An is a 69 y.o. female who presents with the following  Chief Complaint   Patient presents with    Results       HPI       Here for a follow up for EMG and neuropathy. On low dose Lyrica 25 mg TID. Only taking once daily. She states no changes and side effects. She is not sleeping well due to the legs also. She is painful in the legs with spasms, twitching, jerking. never tried Elavil. Failed Gabapentin     Allergies   Allergen Reactions    Byetta [Exenatide] Nausea and Vomiting    Metformin Nausea and Vomiting       Current Outpatient Medications   Medication Sig    amitriptyline (ELAVIL) 10 mg tablet Take 1-2 Tabs by mouth nightly.  empagliflozin (JARDIANCE) 25 mg tablet Take  by mouth daily.  losartan (COZAAR) 50 mg tablet Take  by mouth daily.  metoprolol tartrate (LOPRESSOR) 50 mg tablet Take 25 mg by mouth two (2) times a day.  pregabalin (LYRICA) 25 mg capsule 1 po tid prn  Indications: diabetic complication causing injury to some body nerves    albuterol-ipratropium (DUO-NEB) 2.5 mg-0.5 mg/3 ml nebu 3 mL by Nebulization route every six (6) hours as needed.  guaiFENesin-codeine (ROBITUSSIN AC) 100-10 mg/5 mL solution Take 10 mL by mouth every six (6) hours as needed for Cough. Max Daily Amount: 40 mL.  arformoterol 15 mcg/2 mL nebu 15 mcg, budesonide 0.5 mg/2 mL nbsp 500 mcg 1 Dose by Nebulization route every twelve (12) hours.  naloxone (NARCAN) 4 mg/actuation nasal spray Use 1 spray intranasally, then discard. Repeat with new spray every 2 min as needed for opioid overdose symptoms, alternating nostrils.  gabapentin (NEURONTIN) 100 mg capsule Take 100 mg by mouth daily.  insulin lispro (HUMALOG) 100 unit/mL kwikpen by SubCUTAneous route Before breakfast, lunch, and dinner. Sliding Scale    lisinopril (PRINIVIL, ZESTRIL) 20 mg tablet Take 40 mg by mouth daily.     acetaminophen (TYLENOL) 500 mg tablet Take 1,000 mg by mouth two (2) times daily as needed for Pain.  insulin detemir (LEVEMIR FLEXTOUCH) 100 unit/mL (3 mL) inpn 60 Units by SubCUTAneous route ACB/HS.  aspirin delayed-release 81 mg tablet Take 81 mg by mouth daily.  ALPRAZolam (XANAX) 1 mg tablet Take 0.5 mg by mouth nightly as needed for Sleep. No current facility-administered medications for this visit. Social History     Tobacco Use   Smoking Status Former Smoker    Packs/day: 2.00    Years: 25.00    Pack years: 50.00    Last attempt to quit: 2002    Years since quittin.3   Smokeless Tobacco Never Used       Past Medical History:   Diagnosis Date    Anxiety and depression     anxiety/depression    Arthritis     osteo    CAD (coronary artery disease)     Chronic pain     knee pain,jointpain,muscle pain, Back pain    DM [facility]diabetes mellitus) ([facility] [address]     Kidney mass     mass on kidney    Nausea & vomiting     Neuropathy     Sleep apnea        Past Surgical History:   Procedure Laterality Date    BREAST SURGERY PROCEDURE UNLISTED      breast biopsy    CARDIAC SURG PROCEDURE UNLIST      Bypass    HX CHOLECYSTECTOMY      HX OTHER SURGICAL      left foot surgery.  18srews, 3plates       Family History   Problem Relation Age of Onset    Heart Disease Mother     Stroke Mother     Hypertension Father     Diabetes Sister        Social History     Socioeconomic History    Marital status: SINGLE     Spouse name: Not on file    Number of children: Not on file    Years of education: Not on file    Highest education level: Not on file   Tobacco Use    Smoking status: Former Smoker     Packs/day: 2.00     Years: 25.00     Pack years: 50.00     Last attempt to quit: 2002     Years since quittin.3    Smokeless tobacco: Never Used   Substance and Sexual Activity    Alcohol use: No     Alcohol/week: 1.0 standard drinks     Types: 1 Cans of beer per week    Drug use: No    Sexual activity: Never       ROS    Remainder of comprehensive review is negative. Physical Exam :    Visit Vitals  BP (!) 148/98   Pulse 74   Ht 5' 4\" (1.626 m)   SpO2 92%   BMI 41.54 kg/m²             Results for orders placed or performed in visit on 01/30/20   SED RATE (ESR)   Result Value Ref Range    Sed rate (ESR) 24 0 - 40 mm/hr   JULIET, DIRECT, W/REFLEX   Result Value Ref Range    Antinuclear Antibodies Direct Negative Negative   PROTEIN ELECTROPHORESIS   Result Value Ref Range    Protein, total 7.0 6.0 - 8.5 g/dL    Albumin 3.5 2.9 - 4.4 g/dL    Alpha-1-globulin 0.3 0.0 - 0.4 g/dL    ALPHA-2 GLOBULIN 0.9 0.4 - 1.0 g/dL    Beta globulin 1.4 (H) 0.7 - 1.3 g/dL    Gamma globulin 1.0 0.4 - 1.8 g/dL    M-Mainor Not Observed Not Observed g/dL    Globulin, total 3.5 2.2 - 3.9 g/dL    A/G ratio 1.0 0.7 - 1.7    Please note Comment    VITAMIN B12 & FOLATE   Result Value Ref Range    Vitamin B12 368 232 - 1,245 pg/mL    Folate 12.6 >3.0 ng/mL   TSH 3RD GENERATION   Result Value Ref Range    TSH 1.410 0.450 - 4.500 uIU/mL   SJOGREN'S ABS, SSA AND SSB   Result Value Ref Range    Sjogren's Anti-SS-A <0.2 0.0 - 0.9 AI    Sjogren's Anti-SS-B <0.2 0.0 - 0.9 AI       Orders Placed This Encounter    amitriptyline (ELAVIL) 10 mg tablet     Sig: Take 1-2 Tabs by mouth nightly. Dispense:  60 Tab     Refill:  5       1. Diabetic polyneuropathy associated with type 2 diabetes mellitus ([facility] [address]      Discussed EMG in full. No questions. Try Elavil 1-2 tablets nightly to help sleep, neuropathic pain. She will try the Lyrica BID first to see if this helps and if no switch. Discussed POC and side effects. Also discussed normal labs. Keep active. Keep diabetes under control.                This note will not be viewable in [employer]

## 2020-03-26 ENCOUNTER — APPOINTMENT (OUTPATIENT)
Dept: GENERAL RADIOLOGY | Age: 75
End: 2020-03-26
Attending: EMERGENCY MEDICINE
Payer: MEDICARE

## 2020-03-26 ENCOUNTER — HOSPITAL ENCOUNTER (EMERGENCY)
Age: 75
Discharge: HOME | End: 2020-03-26
Attending: EMERGENCY MEDICINE | Admitting: EMERGENCY MEDICINE
Payer: MEDICARE

## 2020-03-26 VITALS
DIASTOLIC BLOOD PRESSURE: 56 MMHG | RESPIRATION RATE: 14 BRPM | SYSTOLIC BLOOD PRESSURE: 157 MMHG | OXYGEN SATURATION: 93 % | TEMPERATURE: 98.1 F | HEART RATE: 94 BPM

## 2020-03-26 DIAGNOSIS — I16.0 HYPERTENSIVE URGENCY: Primary | ICD-10-CM

## 2020-03-26 LAB
ALBUMIN SERPL-MCNC: 3.6 G/DL (ref 3.5–5)
ALBUMIN/GLOB SERPL: 0.8 {RATIO} (ref 1.1–2.2)
ALP SERPL-CCNC: 98 U/L (ref 45–117)
ALT SERPL-CCNC: 18 U/L (ref 12–78)
ANION GAP SERPL CALC-SCNC: 7 MMOL/L (ref 5–15)
AST SERPL-CCNC: 13 U/L (ref 15–37)
ATRIAL RATE: 91 BPM
BASOPHILS # BLD: 0.1 K/UL (ref 0–0.1)
BASOPHILS NFR BLD: 1 % (ref 0–1)
BILIRUB SERPL-MCNC: 0.3 MG/DL (ref 0.2–1)
BNP SERPL-MCNC: 334 PG/ML
BUN SERPL-MCNC: 18 MG/DL (ref 6–20)
BUN/CREAT SERPL: 23 (ref 12–20)
CALCIUM SERPL-MCNC: 9 MG/DL (ref 8.5–10.1)
CALCULATED P AXIS, ECG09: 58 DEGREES
CALCULATED R AXIS, ECG10: 38 DEGREES
CALCULATED T AXIS, ECG11: 65 DEGREES
CHLORIDE SERPL-SCNC: 108 MMOL/L (ref 97–108)
CO2 SERPL-SCNC: 26 MMOL/L (ref 21–32)
COMMENT, HOLDF: NORMAL
CREAT SERPL-MCNC: 0.78 MG/DL (ref 0.55–1.02)
DIAGNOSIS, 93000: NORMAL
DIFFERENTIAL METHOD BLD: NORMAL
EOSINOPHIL # BLD: 0.1 K/UL (ref 0–0.4)
EOSINOPHIL NFR BLD: 1 % (ref 0–7)
ERYTHROCYTE [DISTWIDTH] IN BLOOD BY AUTOMATED COUNT: 12.4 % (ref 11.5–14.5)
GLOBULIN SER CALC-MCNC: 4.6 G/DL (ref 2–4)
GLUCOSE SERPL-MCNC: 168 MG/DL (ref 65–100)
HCT VFR BLD AUTO: 45.7 % (ref 35–47)
HGB BLD-MCNC: 14.7 G/DL (ref 11.5–16)
IMM GRANULOCYTES # BLD AUTO: 0 K/UL (ref 0–0.04)
IMM GRANULOCYTES NFR BLD AUTO: 0 % (ref 0–0.5)
LYMPHOCYTES # BLD: 2.5 K/UL (ref 0.8–3.5)
LYMPHOCYTES NFR BLD: 23 % (ref 12–49)
MAGNESIUM SERPL-MCNC: 2.3 MG/DL (ref 1.6–2.4)
MCH RBC QN AUTO: 30.4 PG (ref 26–34)
MCHC RBC AUTO-ENTMCNC: 32.2 G/DL (ref 30–36.5)
MCV RBC AUTO: 94.4 FL (ref 80–99)
MONOCYTES # BLD: 0.8 K/UL (ref 0–1)
MONOCYTES NFR BLD: 7 % (ref 5–13)
NEUTS SEG # BLD: 7.4 K/UL (ref 1.8–8)
NEUTS SEG NFR BLD: 68 % (ref 32–75)
NRBC # BLD: 0 K/UL (ref 0–0.01)
NRBC BLD-RTO: 0 PER 100 WBC
P-R INTERVAL, ECG05: 142 MS
PHOSPHATE SERPL-MCNC: 3.3 MG/DL (ref 2.6–4.7)
PLATELET # BLD AUTO: 215 K/UL (ref 150–400)
PMV BLD AUTO: 10.7 FL (ref 8.9–12.9)
POTASSIUM SERPL-SCNC: 4 MMOL/L (ref 3.5–5.1)
PROT SERPL-MCNC: 8.2 G/DL (ref 6.4–8.2)
Q-T INTERVAL, ECG07: 384 MS
QRS DURATION, ECG06: 72 MS
QTC CALCULATION (BEZET), ECG08: 472 MS
RBC # BLD AUTO: 4.84 M/UL (ref 3.8–5.2)
SAMPLES BEING HELD,HOLD: NORMAL
SODIUM SERPL-SCNC: 141 MMOL/L (ref 136–145)
TROPONIN I SERPL-MCNC: <0.05 NG/ML
TSH SERPL DL<=0.05 MIU/L-ACNC: 1.82 UIU/ML (ref 0.36–3.74)
VENTRICULAR RATE, ECG03: 91 BPM
WBC # BLD AUTO: 10.9 K/UL (ref 3.6–11)

## 2020-03-26 PROCEDURE — 96374 THER/PROPH/DIAG INJ IV PUSH: CPT

## 2020-03-26 PROCEDURE — 36415 COLL VENOUS BLD VENIPUNCTURE: CPT

## 2020-03-26 PROCEDURE — 85025 COMPLETE CBC W/AUTO DIFF WBC: CPT

## 2020-03-26 PROCEDURE — 84100 ASSAY OF PHOSPHORUS: CPT

## 2020-03-26 PROCEDURE — 83735 ASSAY OF MAGNESIUM: CPT

## 2020-03-26 PROCEDURE — 99285 EMERGENCY DEPT VISIT HI MDM: CPT

## 2020-03-26 PROCEDURE — 93005 ELECTROCARDIOGRAM TRACING: CPT

## 2020-03-26 PROCEDURE — 83880 ASSAY OF NATRIURETIC PEPTIDE: CPT

## 2020-03-26 PROCEDURE — 71045 X-RAY EXAM CHEST 1 VIEW: CPT

## 2020-03-26 PROCEDURE — 80053 COMPREHEN METABOLIC PANEL: CPT

## 2020-03-26 PROCEDURE — 74011250636 HC RX REV CODE- 250/636: Performed by: EMERGENCY MEDICINE

## 2020-03-26 PROCEDURE — 84443 ASSAY THYROID STIM HORMONE: CPT

## 2020-03-26 PROCEDURE — 84484 ASSAY OF TROPONIN QUANT: CPT

## 2020-03-26 RX ORDER — HYDRALAZINE HYDROCHLORIDE 50 MG/1
50 TABLET, FILM COATED ORAL 2 TIMES DAILY
Qty: 30 TAB | Refills: 0 | Status: SHIPPED | OUTPATIENT
Start: 2020-03-26 | End: 2021-02-11 | Stop reason: ALTCHOICE

## 2020-03-26 RX ORDER — HYDRALAZINE HYDROCHLORIDE 20 MG/ML
20 INJECTION INTRAMUSCULAR; INTRAVENOUS
Status: COMPLETED | OUTPATIENT
Start: 2020-03-26 | End: 2020-03-26

## 2020-03-26 RX ADMIN — HYDRALAZINE HYDROCHLORIDE 20 MG: 20 INJECTION INTRAMUSCULAR; INTRAVENOUS at 04:26

## 2020-03-26 NOTE — DISCHARGE INSTRUCTIONS
Patient Education        Acute High Blood Pressure: Care Instructions  Your Care Instructions    Acute high blood pressure is very high blood pressure. It's a serious problem. Very high blood pressure can damage your brain, heart, eyes, and kidneys. You may have been given medicines to lower your blood pressure. You may have gotten them as pills or through a needle in one of your veins. This is called an IV. And maybe you were given other medicines too. These can be needed when high blood pressure causes other problems. To keep your blood pressure at a lower level, you may need to make healthy lifestyle changes. And you will probably need to take medicines. Be sure to follow up with your doctor about your blood pressure and what you can do about it. Follow-up care is a key part of your treatment and safety. Be sure to make and go to all appointments, and call your doctor if you are having problems. It's also a good idea to know your test results and keep a list of the medicines you take. How can you care for yourself at home? · See your doctor as often as he or she recommends. This is to make sure your blood pressure is under control. You will probably go at least 2 times a year. But it may be more often at first.  · Take your blood pressure medicine exactly as prescribed. You may take one or more types. They include diuretics, beta-blockers, ACE inhibitors, calcium channel blockers, and angiotensin II receptor blockers. Call your doctor if you think you are having a problem with your medicine. · If you take blood pressure medicine, talk to your doctor before you take decongestants or anti-inflammatory medicine, such as ibuprofen. These can raise blood pressure. · Learn how to check your blood pressure at home. Check it often. · Ask your doctor if it's okay to drink alcohol. · Talk to your doctor about lifestyle changes that can help blood pressure.  These include being active and managing your weight. · Don't smoke. Smoking increases your risk for heart attack and stroke. When should you call for help? Call  911 anytime you think you may need emergency care. This may mean having symptoms that suggest that your blood pressure is causing a serious heart or blood vessel problem. Your blood pressure may be over 180/120.   For example, call  911 if:    · You have symptoms of a heart attack. These may include:  ? Chest pain or pressure, or a strange feeling in the chest.  ? Sweating. ? Shortness of breath. ? Nausea or vomiting. ? Pain, pressure, or a strange feeling in the back, neck, jaw, or upper belly or in one or both shoulders or arms. ? Lightheadedness or sudden weakness. ? A fast or irregular heartbeat.     · You have symptoms of a stroke. These may include:  ? Sudden numbness, tingling, weakness, or loss of movement in your face, arm, or leg, especially on only one side of your body. ? Sudden vision changes. ? Sudden trouble speaking. ? Sudden confusion or trouble understanding simple statements. ? Sudden problems with walking or balance. ? A sudden, severe headache that is different from past headaches.     · You have severe back or belly pain.    Do not wait until your blood pressure comes down on its own. Get help right away.   Call your doctor now or seek immediate care if:    · Your blood pressure is much higher than normal (such as 180/120 or higher), but you don't have symptoms.     · You think high blood pressure is causing symptoms, such as:  ? Severe headache.  ? Blurry vision.    Watch closely for changes in your health, and be sure to contact your doctor if:    · Your blood pressure measures higher than your doctor recommends at least 2 times. That means the top number is higher or the bottom number is higher, or both.     · You think you may be having side effects from your blood pressure medicine. Where can you learn more?   Go to http://cyndy.info/  Enter [id number] in the search box to learn more about \"Acute High Blood Pressure: Care Instructions. \"  Current as of: December 15, 2019Content Version: 12.4  © 6118-5763 [employer]. Care instructions adapted under license by [employer] (which disclaims liability or warranty for this information). If you have questions about a medical condition or this instruction, always ask your healthcare professional. [address] any warranty or liability for your use of this information.

## 2020-03-26 NOTE — ED TRIAGE NOTES
Triage: Pt advises that she feels hot and flushed and advises her blood pressure has been high for the last week.

## 2020-03-26 NOTE — ED PROVIDER NOTES
The patient is a 61-year-old female with a past medical history significant for anxiety, depression, arthritis, coronary disease, chronic pain, diabetes, kidney mass, neuropathy, hyperemesis, status post breast biopsy, CABG, cholecystectomy and morbid obesity who presents to the ED with a complaint of elevated blood pressure off and on for the past week. The patient was seen by PCP and taken off losartan and placed on different antibiotic that she does not remember. The patient also took the medication for 1 day then return back to her previous medication because of a delayed allergic reaction. Now she is complaining of face flushed, dizziness, lightheadedness, with a blood pressure of 200/110 at home. She denies any fever, sore throat, cough, congestion, headache, neck and back pain, chest pain, shortness of breath, nausea, vomiting, diarrhea, constipation, dysuria, extremity weakness or numbness, sick contact, skin rash, leg pain and swelling, prior history of the same. Past Medical History:   Diagnosis Date    Anxiety and depression     anxiety/depression    Arthritis     osteo    CAD (coronary artery disease)     Chronic pain     knee pain,jointpain,muscle pain, Back pain    DM [facility]diabetes mellitus) ([facility] [address]     Kidney mass     mass on kidney    Nausea & vomiting     Neuropathy     Sleep apnea        Past Surgical History:   Procedure Laterality Date    BREAST SURGERY PROCEDURE UNLISTED      breast biopsy    CARDIAC SURG PROCEDURE UNLIST      Bypass    HX CHOLECYSTECTOMY      HX OTHER SURGICAL      left foot surgery.  18srews, 3plates         Family History:   Problem Relation Age of Onset    Heart Disease Mother     Stroke Mother     Hypertension Father     Diabetes Sister        Social History     Socioeconomic History    Marital status: SINGLE     Spouse name: Not on file    Number of children: Not on file    Years of education: Not on file    Highest education level: Not on file Occupational History    Not on file   Social Needs    Financial resource strain: Not on file    Food insecurity     Worry: Not on file     Inability: Not on file    Transportation needs     Medical: Not on file     Non-medical: Not on file   Tobacco Use    Smoking status: Former Smoker     Packs/day: 2.00     Years: 25.00     Pack years: 50.00     Last attempt to quit: 2002     Years since quittin.3    Smokeless tobacco: Never Used   Substance and Sexual Activity    Alcohol use: No     Alcohol/week: 1.0 standard drinks     Types: 1 Cans of beer per week    Drug use: No    Sexual activity: Never   Lifestyle    Physical activity     Days per week: Not on file     Minutes per session: Not on file    Stress: Not on file   Relationships    Social connections     Talks on phone: Not on file     Gets together: Not on file     Attends [employer] service: Not on file     Active member of club or organization: Not on file     Attends meetings of clubs or organizations: Not on file     Relationship status: Not on file    Intimate partner violence     Fear of current or ex partner: Not on file     Emotionally abused: Not on file     Physically abused: Not on file     Forced sexual activity: Not on file   Other Topics Concern    Not on file   Social History Narrative    Not on file         ALLERGIES: Byetta [exenatide] and Metformin    Review of Systems   All other systems reviewed and are negative. Vitals:    20 0400 20 0415 20 0430 20 0445   BP: 187/64  196/59 127/88   Pulse:    98   Resp:    19   Temp:       SpO2: 90% 93% 93% 95%            Physical Exam  Vitals signs and nursing note reviewed. Exam conducted with a chaperone present. CONSTITUTIONAL: Well-appearing; well-nourished; in no apparent distress  HEAD: Normocephalic; atraumatic  EYES: PERRL; EOM intact; conjunctiva and sclera are clear bilaterally.   ENT: No rhinorrhea; normal pharynx with no tonsillar hypertrophy; mucous membranes pink/moist, no erythema, no exudate. NECK: Supple; non-tender; no cervical lymphadenopathy  CARD: Normal S1, S2; no murmurs, rubs, or gallops. Regular rate and rhythm. RESP: Normal respiratory effort; breath sounds clear and equal bilaterally; no wheezes, rhonchi, or rales. ABD: Normal bowel sounds; non-distended; non-tender; no palpable organomegaly, no masses, no bruits. Back Exam: Normal inspection; no vertebral point tenderness, no CVA tenderness. Normal range of motion. EXT: Normal ROM in all four extremities; non-tender to palpation; no swelling or deformity; distal pulses are normal, no edema. SKIN: Warm; dry; no rash. NEURO:Alert and oriented x 3, coherent, ALEXANDER-XII grossly intact, sensory and motor are non-focal.        MDM  Number of Diagnoses or Management Options  Diagnosis management comments: Assessment: Hypertensive urgency rule out electrolyte normality, thyroid disease, ACS. Plan: Lab/IV fluid/hydralazine/EKG/chest x-ray/serial exam/ Monitor and Reevaluate. Amount and/or Complexity of Data Reviewed  Clinical lab tests: ordered and reviewed  Tests in the radiology section of CPT®: ordered and reviewed  Tests in the medicine section of CPT®: reviewed and ordered  Discussion of test results with the performing providers: yes  Decide to obtain previous medical records or to obtain history from someone other than the patient: yes  Review and summarize past medical records: yes  Discuss the patient with other providers: yes  Independent visualization of images, tracings, or specimens: yes    Risk of Complications, Morbidity, and/or Mortality  Presenting problems: moderate  Diagnostic procedures: moderate  Management options: moderate    Patient Progress  Patient progress: stable         Procedures    ED EKG interpretation:  Rhythm: normal sinus rhythm; and regular . Rate (approx.): 91;  Axis: normal; P wave: normal; QRS interval: normal ; ST/T wave: normal; in  Lead: Diffusely; Other findings: abnormal ekg. This EKG was interpreted by Willie Banegas MD,ED Provider. XRAY INTERPRETATION (ED MD)  Chest Xray  No acute process seen. Normal heart size. No bony abnormalities. No infiltrate. Luke Velazquez MD 5:41 AM    Visit Vitals  /88   Pulse 98   Temp 98.5 °F (36.9 °C)   Resp 19   SpO2 95%         Progress Note:   Pt has been reexamined by Willie Banegas MD. Pt is feeling much better. Symptoms have improved. All available results have been reviewed with pt and any available family. Pt understands sx, dx, and tx in ED. Care plan has been outlined and questions have been answered. Pt is ready to go home. Will send home on hypertension urgency instruction. Prescription of hydralazine. Outpatient referral with PCP as needed. Written by Willie Banegas MD,5:41 AM    .   .

## 2020-03-27 ENCOUNTER — PATIENT OUTREACH (OUTPATIENT)
Dept: FAMILY MEDICINE CLINIC | Age: 75
End: 2020-03-27

## 2020-03-27 NOTE — PROGRESS NOTES
Patient contacted regarding recent discharge and COVID-19 risk   Care Transition Nurse/ Ambulatory Care Manager contacted the patient by telephone to perform post discharge assessment. Verified name and  with patient as identifiers. Patient has following risk factors of: HTN. CTN/ACM reviewed discharge instructions, medical action plan and red flags related to discharge diagnosis. Reviewed and educated them on any new and changed medications related to discharge diagnosis. Advised obtaining a 90-day supply of all daily and as-needed medications. Education provided regarding infection prevention, and signs and symptoms of COVID-19 and when to seek medical attention with patient who verbalized understanding. Discussed exposure protocols and quarantine from [address] you at higher risk for severe illness  and given an opportunity for questions and concerns. The patient agrees to contact the COVID-19 hotline [phone] or PCP office for questions related to their healthcare. CTN/ACM provided contact information for future reference. From CDC: Are you at higher risk for severe illness?  Wash your hands often.  Avoid close contact (6 feet, which is about two arm lengths) with people who are sick.  Put distance between yourself and other people if COVID-19 is spreading in your community.  Clean and disinfect frequently touched surfaces.  Avoid all cruise travel and non-essential air travel.  Call your healthcare professional if you have concerns about COVID-19 and your underlying condition or if you are sick.     For more information on steps you can take to protect yourself, see CDC's How to Protect Yourself

## 2020-04-10 ENCOUNTER — PATIENT OUTREACH (OUTPATIENT)
Dept: FAMILY MEDICINE CLINIC | Age: 75
End: 2020-04-10

## 2020-04-10 NOTE — PROGRESS NOTES
Patient resolved from Transition of Care episode on 4/10/20  Patient/family has been provided the following resources and education related to COVID-19:                         Signs, symptoms and red flags related to COVID-19            CDC exposure and quarantine guidelines            [facility]it exposure contact - [phone]            Contact for their local Department of Health                 Patient currently reports that the following symptoms have improved:  no new/worsening symptoms     No further outreach scheduled with this CTN/ACM. Episode of Care resolved. Patient has this CTN/ACM contact information if future needs arise.

## 2020-07-27 ENCOUNTER — OFFICE VISIT (OUTPATIENT)
Dept: NEUROLOGY | Age: 75
End: 2020-07-27

## 2020-07-27 VITALS
OXYGEN SATURATION: 91 % | WEIGHT: 242 LBS | BODY MASS INDEX: 41.54 KG/M2 | HEART RATE: 81 BPM | DIASTOLIC BLOOD PRESSURE: 62 MMHG | SYSTOLIC BLOOD PRESSURE: 124 MMHG | TEMPERATURE: 97.1 F

## 2020-07-27 DIAGNOSIS — G62.9 POLYNEUROPATHY: Primary | ICD-10-CM

## 2020-07-27 RX ORDER — IRBESARTAN AND HYDROCHLOROTHIAZIDE 300; 12.5 MG/1; MG/1
1 TABLET, FILM COATED ORAL DAILY
COMMUNITY
Start: 2020-07-25

## 2020-07-27 RX ORDER — DOXYCYCLINE HYCLATE 100 MG
TABLET ORAL 2 TIMES DAILY
COMMUNITY
Start: 2020-07-22 | End: 2021-02-11 | Stop reason: ALTCHOICE

## 2020-07-27 RX ORDER — FLASH GLUCOSE SENSOR
KIT MISCELLANEOUS
COMMUNITY
Start: 2020-06-22 | End: 2021-09-20

## 2020-07-27 NOTE — PROGRESS NOTES
Sharma Severs is a 70 y.o. female who presents with the following  Chief Complaint   Patient presents with    Follow-up       HPI     On low dose Lyrica 25 mg TID. Only taking once daily. Had stopped and was not taking this actually. She states no changes and side effects. She is not sleeping well due to the legs also. She is painful in the legs with spasms, twitching, jerking. Failed Elavil, Gabapentin. She has not tried any creams. Having trouble with balance, coordination, falls. Wants to see pain management           Allergies   Allergen Reactions    Byetta [Exenatide] Nausea and Vomiting    Metformin Nausea and Vomiting       Current Outpatient Medications   Medication Sig    doxycycline (VIBRA-TABS) 100 mg tablet two (2) times a day.  FreeStyle Geena 14 Day Sensor kit TESTING 3 TIMES A DAY    irbesartan-hydroCHLOROthiazide (AVALIDE) 300-12.5 mg per tablet     hydrALAZINE (APRESOLINE) 50 mg tablet Take 1 Tab by mouth two (2) times a day.  amitriptyline (ELAVIL) 10 mg tablet Take 1-2 Tabs by mouth nightly.  empagliflozin (JARDIANCE) 25 mg tablet Take  by mouth daily.  metoprolol tartrate (LOPRESSOR) 50 mg tablet Take 25 mg by mouth daily.  pregabalin (LYRICA) 25 mg capsule 1 po tid prn  Indications: diabetic complication causing injury to some body nerves    albuterol-ipratropium (DUO-NEB) 2.5 mg-0.5 mg/3 ml nebu 3 mL by Nebulization route every six (6) hours as needed.  guaiFENesin-codeine (ROBITUSSIN AC) 100-10 mg/5 mL solution Take 10 mL by mouth every six (6) hours as needed for Cough. Max Daily Amount: 40 mL.  arformoterol 15 mcg/2 mL nebu 15 mcg, budesonide 0.5 mg/2 mL nbsp 500 mcg 1 Dose by Nebulization route every twelve (12) hours.  naloxone (NARCAN) 4 mg/actuation nasal spray Use 1 spray intranasally, then discard. Repeat with new spray every 2 min as needed for opioid overdose symptoms, alternating nostrils.     insulin lispro (HUMALOG) 100 unit/mL kwikpen by SubCUTAneous route Before breakfast, lunch, and dinner. Sliding Scale    acetaminophen (TYLENOL) 500 mg tablet Take 1,000 mg by mouth two (2) times daily as needed for Pain.  insulin detemir (LEVEMIR FLEXTOUCH) 100 unit/mL (3 mL) inpn 60 Units by SubCUTAneous route ACB/HS.  aspirin delayed-release 81 mg tablet Take 81 mg by mouth daily.  ALPRAZolam (XANAX) 1 mg tablet Take 0.5 mg by mouth nightly as needed for Sleep. No current facility-administered medications for this visit. Social History     Tobacco Use   Smoking Status Former Smoker    Packs/day: 2.00    Years: 25.00    Pack years: 50.00    Last attempt to quit: 2002    Years since quittin.6   Smokeless Tobacco Never Used       Past Medical History:   Diagnosis Date    Anxiety and depression     anxiety/depression    Arthritis     osteo    CAD (coronary artery disease)     Chronic pain     knee pain,jointpain,muscle pain, Back pain    DM (diabetes mellitus) ([facility] [address]     Kidney mass     mass on kidney    Nausea & vomiting     Neuropathy     Sleep apnea        Past Surgical History:   Procedure Laterality Date    BREAST SURGERY PROCEDURE UNLISTED      breast biopsy    CARDIAC SURG PROCEDURE UNLIST      Bypass    HX CHOLECYSTECTOMY      HX OTHER SURGICAL      left foot surgery. 18srews, 3plates       Family History   Problem Relation Age of Onset    Heart Disease Mother     Stroke Mother     Hypertension Father     Diabetes Sister        Social History     Socioeconomic History    Marital status: SINGLE     Spouse name: Not on file    Number of children: Not on file    Years of education: Not on file    Highest education level: Not on file   Tobacco Use    Smoking status: Former Smoker     Packs/day: 2.00     Years: 25.00     Pack years: 50.00     Last attempt to quit: 2002     Years since quittin.6    Smokeless tobacco: Never Used   Substance and Sexual Activity    Alcohol use:  No Alcohol/week: 1.0 standard drinks     Types: 1 Cans of beer per week    Drug use: No    Sexual activity: Never       Review of Systems   Eyes: Negative for blurred vision, double vision and photophobia. Cardiovascular: Negative for chest pain and palpitations. Gastrointestinal: Negative for nausea and vomiting. Neurological: Positive for tingling, sensory change, seizures and weakness. Negative for dizziness, tremors, loss of consciousness and headaches. Remainder of comprehensive review is negative. Physical Exam :    Visit Vitals  /62   Pulse 81   Temp 97.1 °F (36.2 °C)   Wt 109.8 kg (242 lb)   SpO2 91%   BMI 41.54 kg/m²       General: Well defined, nourished, and groomed individual in no acute distress.    Neck: Supple, nontender, no bruits, no pain with resistance to active range of motion.    Heart: Regular rate and rhythm, no murmurs, rub, or gallop. Normal S1S2. Lungs: Clear to auscultation bilaterally with equal chest expansion, no cough, no wheeze  Musculoskeletal: Extremities revealed no edema and had full range of motion of joints.    Psych: Good mood and bright affect    NEUROLOGICAL EXAMINATION:    Mental Status: Alert and oriented to person, place, and time    Cranial Nerves:    II, III, IV, VI: Visual acuity grossly intact. Visual fields are normal.    Pupils are equal, round, and reactive to light and accommodation.    Extra-ocular movements are full and fluid. Fundoscopic exam was benign, no ptosis or nystagmus.    V-XII: Hearing is grossly intact. Facial features are symmetric, with normal sensation and strength. The palate rises symmetrically and the tongue protrudes midline. Sternocleidomastoids 5/5. Motor Examination: Normal tone, bulk, and strength, 5/5 muscle strength throughout. Coordination: Finger to nose was normal. No resting or intention tremor    Gait and Station: Steady while walking. Normal arm swing. No pronator drift.  No muscle wasting or fasiculations noted. Reflexes: DTRs 2+ throughout. Neurosensory Exam:  Stocking glove sensory loss to temperature, vibration, and pinprick to the thighs. Results for orders placed or performed during the hospital encounter of 03/26/20   CBC WITH AUTOMATED DIFF   Result Value Ref Range    WBC 10.9 3.6 - 11.0 K/uL    RBC 4.84 3.80 - 5.20 M/uL    HGB 14.7 11.5 - 16.0 g/dL    HCT 45.7 35.0 - 47.0 %    MCV 94.4 80.0 - 99.0 FL    MCH 30.4 26.0 - 34.0 PG    MCHC 32.2 30.0 - 36.5 g/dL    RDW 12.4 11.5 - 14.5 %    PLATELET 812 337 - 419 K/uL    MPV 10.7 8.9 - 12.9 FL    NRBC 0.0 0  WBC    ABSOLUTE NRBC 0.00 0.00 - 0.01 K/uL    NEUTROPHILS 68 32 - 75 %    LYMPHOCYTES 23 12 - 49 %    MONOCYTES 7 5 - 13 %    EOSINOPHILS 1 0 - 7 %    BASOPHILS 1 0 - 1 %    IMMATURE GRANULOCYTES 0 0.0 - 0.5 %    ABS. NEUTROPHILS 7.4 1.8 - 8.0 K/UL    ABS. LYMPHOCYTES 2.5 0.8 - 3.5 K/UL    ABS. MONOCYTES 0.8 0.0 - 1.0 K/UL    ABS. EOSINOPHILS 0.1 0.0 - 0.4 K/UL    ABS. BASOPHILS 0.1 0.0 - 0.1 K/UL    ABS. IMM. GRANS. 0.0 0.00 - 0.04 K/UL    DF AUTOMATED     METABOLIC PANEL, COMPREHENSIVE   Result Value Ref Range    Sodium 141 136 - 145 mmol/L    Potassium 4.0 3.5 - 5.1 mmol/L    Chloride 108 97 - 108 mmol/L    CO2 26 21 - 32 mmol/L    Anion gap 7 5 - 15 mmol/L    Glucose 168 (H) 65 - 100 mg/dL    BUN 18 6 - 20 MG/DL    Creatinine 0.78 0.55 - 1.02 MG/DL    BUN/Creatinine ratio 23 (H) 12 - 20      GFR est AA >60 >60 ml/min/1.73m2    GFR est non-AA >60 >60 ml/min/1.73m2    Calcium 9.0 8.5 - 10.1 MG/DL    Bilirubin, total 0.3 0.2 - 1.0 MG/DL    ALT (SGPT) 18 12 - 78 U/L    AST (SGOT) 13 (L) 15 - 37 U/L    Alk.  phosphatase 98 45 - 117 U/L    Protein, total 8.2 6.4 - 8.2 g/dL    Albumin 3.6 3.5 - 5.0 g/dL    Globulin 4.6 (H) 2.0 - 4.0 g/dL    A-G Ratio 0.8 (L) 1.1 - 2.2     MAGNESIUM   Result Value Ref Range    Magnesium 2.3 1.6 - 2.4 mg/dL   PHOSPHORUS   Result Value Ref Range    Phosphorus 3.3 2.6 - 4.7 MG/DL   NT-PRO BNP   Result Value Ref Range    NT pro- (H) <125 PG/ML   TSH 3RD GENERATION   Result Value Ref Range    TSH 1.82 0.36 - 3.74 uIU/mL   TROPONIN I   Result Value Ref Range    Troponin-I, Qt. <0.05 <0.05 ng/mL   SAMPLES BEING HELD   Result Value Ref Range    SAMPLES BEING HELD 1RED,1BLU     COMMENT        Add-on orders for these samples will be processed based on acceptable specimen integrity and analyte stability, which may vary by analyte. EKG, 12 LEAD, INITIAL   Result Value Ref Range    Ventricular Rate 91 BPM    Atrial Rate 91 BPM    P-R Interval 142 ms    QRS Duration 72 ms    Q-T Interval 384 ms    QTC Calculation (Bezet) 472 ms    Calculated P Axis 58 degrees    Calculated R Axis 38 degrees    Calculated T Axis 65 degrees    Diagnosis       Normal sinus rhythm  Possible Left atrial enlargement  Borderline ECG  When compared with ECG of 05-JAN-2020 11:55,  No significant change was found  Confirmed by Merry Martinez MD., Ryland Tineo ([id number]) on 3/26/2020 2:44:57 PM         Orders Placed This Encounter    doxycycline (VIBRA-TABS) 100 mg tablet     Sig: two (2) times a day.  FreeStyle Geena 14 Day Sensor kit     Sig: TESTING 3 TIMES A DAY    irbesartan-hydroCHLOROthiazide (AVALIDE) 300-12.5 mg per tablet       No diagnosis found. Neuropathy. Continue to track symptoms. Restart and stay on Lyrica 25 mg TID for prevention.   Sent to [facility] for compounding cream   Refer to pain management as she is in significant pain         This note will not be viewable in [facility]

## 2020-10-13 ENCOUNTER — OFFICE VISIT (OUTPATIENT)
Dept: NEUROLOGY | Age: 75
End: 2020-10-13
Payer: MEDICARE

## 2020-10-13 VITALS
DIASTOLIC BLOOD PRESSURE: 70 MMHG | TEMPERATURE: 97.3 F | WEIGHT: 242 LBS | BODY MASS INDEX: 41.54 KG/M2 | SYSTOLIC BLOOD PRESSURE: 118 MMHG

## 2020-10-13 DIAGNOSIS — G62.9 POLYNEUROPATHY: Primary | ICD-10-CM

## 2020-10-13 PROCEDURE — G8754 DIAS BP LESS 90: HCPCS | Performed by: NURSE PRACTITIONER

## 2020-10-13 PROCEDURE — G9717 DOC PT DX DEP/BP F/U NT REQ: HCPCS | Performed by: NURSE PRACTITIONER

## 2020-10-13 PROCEDURE — G8752 SYS BP LESS 140: HCPCS | Performed by: NURSE PRACTITIONER

## 2020-10-13 PROCEDURE — 3017F COLORECTAL CA SCREEN DOC REV: CPT | Performed by: NURSE PRACTITIONER

## 2020-10-13 PROCEDURE — G8400 PT W/DXA NO RESULTS DOC: HCPCS | Performed by: NURSE PRACTITIONER

## 2020-10-13 PROCEDURE — G8417 CALC BMI ABV UP PARAM F/U: HCPCS | Performed by: NURSE PRACTITIONER

## 2020-10-13 PROCEDURE — G8427 DOCREV CUR MEDS BY ELIG CLIN: HCPCS | Performed by: NURSE PRACTITIONER

## 2020-10-13 PROCEDURE — G8536 NO DOC ELDER MAL SCRN: HCPCS | Performed by: NURSE PRACTITIONER

## 2020-10-13 PROCEDURE — 99213 OFFICE O/P EST LOW 20 MIN: CPT | Performed by: NURSE PRACTITIONER

## 2020-10-13 PROCEDURE — 1101F PT FALLS ASSESS-DOCD LE1/YR: CPT | Performed by: NURSE PRACTITIONER

## 2020-10-13 PROCEDURE — 1090F PRES/ABSN URINE INCON ASSESS: CPT | Performed by: NURSE PRACTITIONER

## 2020-10-13 RX ORDER — FLASH GLUCOSE SCANNING READER
EACH MISCELLANEOUS
COMMUNITY
Start: 2020-09-22 | End: 2021-09-20

## 2020-10-13 RX ORDER — MENTHOL/ZINC OXIDE 0.44-20.6%
1-2 OINTMENT (GRAM) TOPICAL 2 TIMES DAILY
Qty: 71 G | Refills: 5 | Status: SHIPPED | OUTPATIENT
Start: 2020-10-13 | End: 2021-09-20

## 2020-10-13 RX ORDER — ERGOCALCIFEROL 1.25 MG/1
CAPSULE ORAL
COMMUNITY
Start: 2020-08-27 | End: 2021-02-11 | Stop reason: ALTCHOICE

## 2020-10-13 NOTE — PROGRESS NOTES
Tushar Tejada is a 70 y.o. female who presents with the following  Chief Complaint   Patient presents with    Follow-up       HPI       Back on Lyrica 25 mg TID. When she takes it it helps. Trying to remember the TID. Feeling like her feet are numb all over. Worried about open sores. Checking feet. No falls. Wrists and hands are starting to act up. Has feeling like worse in the morning. Right worse then left. No testing for CTS.      She states no changes and side effects. She is not sleeping well due to the legs also. She is painful in the legs with spasms, twitching, jerking. Failed Elavil, Gabapentin. Having trouble with balance, coordination, falls.        Compound cream did not help. Allergies   Allergen Reactions    Byetta [Exenatide] Nausea and Vomiting    Metformin Nausea and Vomiting       Current Outpatient Medications   Medication Sig    ergocalciferol (ERGOCALCIFEROL) 1,250 mcg (50,000 unit) capsule TAKE ONE CAPSULE BY MOUTH EVERY WEEK ON THE SAME DAY FOR 90 DAYS    FreeStyle Geena 14 Day [place] misc TESTING 3 TIMES A DAY    doxycycline (VIBRA-TABS) 100 mg tablet two (2) times a day.  FreeStyle Geena 14 Day Sensor kit TESTING 3 TIMES A DAY    irbesartan-hydroCHLOROthiazide (AVALIDE) 300-12.5 mg per tablet     hydrALAZINE (APRESOLINE) 50 mg tablet Take 1 Tab by mouth two (2) times a day.  amitriptyline (ELAVIL) 10 mg tablet Take 1-2 Tabs by mouth nightly.  empagliflozin (JARDIANCE) 25 mg tablet Take  by mouth daily.  metoprolol tartrate (LOPRESSOR) 50 mg tablet Take 25 mg by mouth daily.  pregabalin (LYRICA) 25 mg capsule 1 po tid prn  Indications: diabetic complication causing injury to some body nerves    albuterol-ipratropium (DUO-NEB) 2.5 mg-0.5 mg/3 ml nebu 3 mL by Nebulization route every six (6) hours as needed.  guaiFENesin-codeine (ROBITUSSIN AC) 100-10 mg/5 mL solution Take 10 mL by mouth every six (6) hours as needed for Cough.  Max Daily Amount: 40 mL.  arformoterol 15 mcg/2 mL nebu 15 mcg, budesonide 0.5 mg/2 mL nbsp 500 mcg 1 Dose by Nebulization route every twelve (12) hours.  naloxone (NARCAN) 4 mg/actuation nasal spray Use 1 spray intranasally, then discard. Repeat with new spray every 2 min as needed for opioid overdose symptoms, alternating nostrils.  insulin lispro (HUMALOG) 100 unit/mL kwikpen by SubCUTAneous route Before breakfast, lunch, and dinner. Sliding Scale    acetaminophen (TYLENOL) 500 mg tablet Take 1,000 mg by mouth two (2) times daily as needed for Pain.  insulin detemir (LEVEMIR FLEXTOUCH) 100 unit/mL (3 mL) inpn 60 Units by SubCUTAneous route ACB/HS.  aspirin delayed-release 81 mg tablet Take 81 mg by mouth daily.  ALPRAZolam (XANAX) 1 mg tablet Take 0.5 mg by mouth nightly as needed for Sleep. No current facility-administered medications for this visit. Social History     Tobacco Use   Smoking Status Former Smoker    Packs/day: 2.00    Years: 25.00    Pack years: 50.00    Last attempt to quit: 2002    Years since quittin.9   Smokeless Tobacco Never Used       Past Medical History:   Diagnosis Date    Anxiety and depression     anxiety/depression    Arthritis     osteo    CAD (coronary artery disease)     Chronic pain     knee pain,jointpain,muscle pain, Back pain    DM [facility]diabetes mellitus) ([facility] [address]     Kidney mass     mass on kidney    Nausea & vomiting     Neuropathy     Sleep apnea        Past Surgical History:   Procedure Laterality Date    BREAST SURGERY PROCEDURE UNLISTED      breast biopsy    CARDIAC SURG PROCEDURE UNLIST      Bypass    HX CHOLECYSTECTOMY      HX OTHER SURGICAL      left foot surgery.  18srews, 3plates       Family History   Problem Relation Age of Onset    Heart Disease Mother     Stroke Mother     Hypertension Father     Diabetes Sister        Social History     Socioeconomic History    Marital status: SINGLE     Spouse name: Not on file    Number of children: Not on file    Years of education: Not on file    Highest education level: Not on file   Tobacco Use    Smoking status: Former Smoker     Packs/day: 2.00     Years: 25.00     Pack years: 50.00     Last attempt to quit: 2002     Years since quittin.9    Smokeless tobacco: Never Used   Substance and Sexual Activity    Alcohol use: No     Alcohol/week: 1.0 standard drinks     Types: 1 Cans of beer per week    Drug use: No    Sexual activity: Never       Review of Systems   Eyes: Negative for blurred vision, double vision and photophobia. Cardiovascular: Negative for chest pain and palpitations. Gastrointestinal: Negative for nausea and vomiting. Musculoskeletal: Negative for falls. Neurological: Positive for tingling and sensory change. Negative for dizziness, tremors and headaches. Remainder of comprehensive review is negative. Physical Exam :    Visit Vitals  /70   Temp 97.3 °F (36.3 °C)   Wt 109.8 kg (242 lb)   BMI 41.54 kg/m²       General: Well defined, nourished, and groomed individual in no acute distress.    Neck: Supple, nontender, no bruits, no pain with resistance to active range of motion.    Heart: Regular rate and rhythm, no murmurs, rub, or gallop. Normal S1S2. Lungs: Clear to auscultation bilaterally with equal chest expansion, no cough, no wheeze  Musculoskeletal: Extremities revealed no edema and had full range of motion of joints.    Psych: Good mood and bright affect    NEUROLOGICAL EXAMINATION:    Mental Status: Alert and oriented to person, place, and time    Cranial Nerves:    II, III, IV, VI: Visual acuity grossly intact. Visual fields are normal.    Pupils are equal, round, and reactive to light and accommodation.    Extra-ocular movements are full and fluid. Fundoscopic exam was benign, no ptosis or nystagmus.    V-XII: Hearing is grossly intact. Facial features are symmetric, with normal sensation and strength.  The palate rises symmetrically and the tongue protrudes midline. Sternocleidomastoids 5/5. Motor Examination: Normal tone, bulk, and strength, 5/5 muscle strength throughout. Coordination: Finger to nose was normal. No resting or intention tremor    Gait and Station: Steady while walking. Normal arm swing. No pronator drift. No muscle wasting or fasiculations noted. Reflexes: DTRs 1+ throughout. Neurosensory Exam:  Stocking glove sensory loss to temperature, vibration, and pinprick to the thighs. Results for orders placed or performed during the hospital encounter of 03/26/20   CBC WITH AUTOMATED DIFF   Result Value Ref Range    WBC 10.9 3.6 - 11.0 K/uL    RBC 4.84 3.80 - 5.20 M/uL    HGB 14.7 11.5 - 16.0 g/dL    HCT 45.7 35.0 - 47.0 %    MCV 94.4 80.0 - 99.0 FL    MCH 30.4 26.0 - 34.0 PG    MCHC 32.2 30.0 - 36.5 g/dL    RDW 12.4 11.5 - 14.5 %    PLATELET 504 461 - 258 K/uL    MPV 10.7 8.9 - 12.9 FL    NRBC 0.0 0  WBC    ABSOLUTE NRBC 0.00 0.00 - 0.01 K/uL    NEUTROPHILS 68 32 - 75 %    LYMPHOCYTES 23 12 - 49 %    MONOCYTES 7 5 - 13 %    EOSINOPHILS 1 0 - 7 %    BASOPHILS 1 0 - 1 %    IMMATURE GRANULOCYTES 0 0.0 - 0.5 %    ABS. NEUTROPHILS 7.4 1.8 - 8.0 K/UL    ABS. LYMPHOCYTES 2.5 0.8 - 3.5 K/UL    ABS. MONOCYTES 0.8 0.0 - 1.0 K/UL    ABS. EOSINOPHILS 0.1 0.0 - 0.4 K/UL    ABS. BASOPHILS 0.1 0.0 - 0.1 K/UL    ABS. IMM.  GRANS. 0.0 0.00 - 0.04 K/UL    DF AUTOMATED     METABOLIC PANEL, COMPREHENSIVE   Result Value Ref Range    Sodium 141 136 - 145 mmol/L    Potassium 4.0 3.5 - 5.1 mmol/L    Chloride 108 97 - 108 mmol/L    CO2 26 21 - 32 mmol/L    Anion gap 7 5 - 15 mmol/L    Glucose 168 (H) 65 - 100 mg/dL    BUN 18 6 - 20 MG/DL    Creatinine 0.78 0.55 - 1.02 MG/DL    BUN/Creatinine ratio 23 (H) 12 - 20      GFR est AA >60 >60 ml/min/1.73m2    GFR est non-AA >60 >60 ml/min/1.73m2    Calcium 9.0 8.5 - 10.1 MG/DL    Bilirubin, total 0.3 0.2 - 1.0 MG/DL    ALT (SGPT) 18 12 - 78 U/L    AST (SGOT) 13 (L) 15 - 37 U/L    Alk. phosphatase 98 45 - 117 U/L    Protein, total 8.2 6.4 - 8.2 g/dL    Albumin 3.6 3.5 - 5.0 g/dL    Globulin 4.6 (H) 2.0 - 4.0 g/dL    A-G Ratio 0.8 (L) 1.1 - 2.2     MAGNESIUM   Result Value Ref Range    Magnesium 2.3 1.6 - 2.4 mg/dL   PHOSPHORUS   Result Value Ref Range    Phosphorus 3.3 2.6 - 4.7 MG/DL   NT-PRO BNP   Result Value Ref Range    NT pro- (H) <125 PG/ML   TSH 3RD GENERATION   Result Value Ref Range    TSH 1.82 0.36 - 3.74 uIU/mL   TROPONIN I   Result Value Ref Range    Troponin-I, Qt. <0.05 <0.05 ng/mL   SAMPLES BEING HELD   Result Value Ref Range    SAMPLES BEING HELD 1RED,1BLU     COMMENT        Add-on orders for these samples will be processed based on acceptable specimen integrity and analyte stability, which may vary by analyte. EKG, 12 LEAD, INITIAL   Result Value Ref Range    Ventricular Rate 91 BPM    Atrial Rate 91 BPM    P-R Interval 142 ms    QRS Duration 72 ms    Q-T Interval 384 ms    QTC Calculation (Bezet) 472 ms    Calculated P Axis 58 degrees    Calculated R Axis 38 degrees    Calculated T Axis 65 degrees    Diagnosis       Normal sinus rhythm  Possible Left atrial enlargement  Borderline ECG  When compared with ECG of 05-JAN-2020 11:55,  No significant change was found  Confirmed by Sheyla Weber MD., Sandra ([id number]) on 3/26/2020 2:44:57 PM         Orders Placed This Encounter    ergocalciferol (ERGOCALCIFEROL) 1,250 mcg (50,000 unit) capsule     Sig: TAKE ONE CAPSULE BY MOUTH EVERY WEEK ON THE SAME DAY FOR 90 DAYS    FreeStyle Geena 14 Day [place] misc     Sig: TESTING 3 TIMES A DAY       No diagnosis found. continue to remember TID dosing. Try Calmoseptine as barrier cream to prevent breakdown   Keep active. Get cock up wrist splints for CTS to try first.   Consider EMG if needed.      This note will not be viewable in [facility]

## 2021-02-11 ENCOUNTER — OFFICE VISIT (OUTPATIENT)
Dept: NEUROLOGY | Age: 76
End: 2021-02-11
Payer: MEDICARE

## 2021-02-11 VITALS
DIASTOLIC BLOOD PRESSURE: 62 MMHG | SYSTOLIC BLOOD PRESSURE: 128 MMHG | WEIGHT: 246 LBS | HEART RATE: 78 BPM | BODY MASS INDEX: 42.23 KG/M2 | OXYGEN SATURATION: 96 %

## 2021-02-11 DIAGNOSIS — M19.90 OSTEOARTHRITIS, UNSPECIFIED OSTEOARTHRITIS TYPE, UNSPECIFIED SITE: ICD-10-CM

## 2021-02-11 DIAGNOSIS — G62.9 PERIPHERAL POLYNEUROPATHY: Primary | ICD-10-CM

## 2021-02-11 PROCEDURE — 3017F COLORECTAL CA SCREEN DOC REV: CPT | Performed by: NURSE PRACTITIONER

## 2021-02-11 PROCEDURE — 99214 OFFICE O/P EST MOD 30 MIN: CPT | Performed by: NURSE PRACTITIONER

## 2021-02-11 PROCEDURE — G8427 DOCREV CUR MEDS BY ELIG CLIN: HCPCS | Performed by: NURSE PRACTITIONER

## 2021-02-11 PROCEDURE — 1101F PT FALLS ASSESS-DOCD LE1/YR: CPT | Performed by: NURSE PRACTITIONER

## 2021-02-11 PROCEDURE — G8400 PT W/DXA NO RESULTS DOC: HCPCS | Performed by: NURSE PRACTITIONER

## 2021-02-11 PROCEDURE — G8754 DIAS BP LESS 90: HCPCS | Performed by: NURSE PRACTITIONER

## 2021-02-11 PROCEDURE — G8536 NO DOC ELDER MAL SCRN: HCPCS | Performed by: NURSE PRACTITIONER

## 2021-02-11 PROCEDURE — G8417 CALC BMI ABV UP PARAM F/U: HCPCS | Performed by: NURSE PRACTITIONER

## 2021-02-11 PROCEDURE — G8752 SYS BP LESS 140: HCPCS | Performed by: NURSE PRACTITIONER

## 2021-02-11 PROCEDURE — 1090F PRES/ABSN URINE INCON ASSESS: CPT | Performed by: NURSE PRACTITIONER

## 2021-02-11 PROCEDURE — G9717 DOC PT DX DEP/BP F/U NT REQ: HCPCS | Performed by: NURSE PRACTITIONER

## 2021-02-11 RX ORDER — TRAMADOL HYDROCHLORIDE 50 MG/1
50-100 TABLET ORAL
Qty: 60 TAB | Refills: 5 | Status: SHIPPED | OUTPATIENT
Start: 2021-02-11 | End: 2021-03-13

## 2021-02-11 RX ORDER — PREGABALIN 25 MG/1
25 CAPSULE ORAL 3 TIMES DAILY
Qty: 90 CAP | Refills: 5 | Status: SHIPPED | OUTPATIENT
Start: 2021-02-11 | End: 2021-09-20

## 2021-02-11 RX ORDER — DICLOFENAC SODIUM 10 MG/G
2-4 GEL TOPICAL 4 TIMES DAILY
Qty: 100 G | Refills: 5 | Status: SHIPPED | OUTPATIENT
Start: 2021-02-11 | End: 2021-09-20

## 2021-02-11 RX ORDER — BUMETANIDE 1 MG/1
1 TABLET ORAL DAILY
COMMUNITY
End: 2021-09-20

## 2021-02-11 NOTE — PROGRESS NOTES
Willow Mhoan is a 70 y.o. female who presents with the following  Chief Complaint   Patient presents with    Follow-up       HPI    FU for neuropathy, leg pain. Things continue to get worse  Saw PCP and orthopedics and then went to pain management. Still no relief    When she was back on Lyrica 25 mg TID it helped relief significantly. When she takes it it helps. Feeling like her feet are numb all over. Knees are really painful also. Has been using OTC diclofenac and this helps in knees and RIGHT shoulder. No falls, but close. Unsteady unbalanced. Pain at the worst is over a 10   Best is a 4 and that's it.      She is not sleeping well due to the legs also. Using really old Tramadol and this has helped. She is painful in the legs with spasms, twitching, jerking. Failed Elavil, Gabapentin. Having trouble with balance, coordination  She is having some depression due to the symptoms and was on Zoloft, Buspar in the past but not right now.        Allergies   Allergen Reactions    Byetta [Exenatide] Nausea and Vomiting    Metformin Nausea and Vomiting       Current Outpatient Medications   Medication Sig    apixaban (Eliquis) 5 mg tablet Take 5 mg by mouth two (2) times a day.  bumetanide (BUMEX) 1 mg tablet Take 1 mg by mouth daily.  diclofenac (VOLTAREN) 1 % gel Apply 2-4 g to affected area four (4) times daily.  pregabalin (LYRICA) 25 mg capsule Take 1 Cap by mouth three (3) times daily. Max Daily Amount: 75 mg.  traMADoL (ULTRAM) 50 mg tablet Take 1-2 Tabs by mouth nightly as needed for Pain for up to 30 days. Max Daily Amount: 100 mg.    FreeStyle Geena 14 Day [place] misc TESTING 3 TIMES A DAY    menthol-zinc oxide (Calmoseptine) 0.44-20.6 % oint Apply 1-2 g to affected area two (2) times a day.     FreeStyle Geena 14 Day Sensor kit TESTING 3 TIMES A DAY    irbesartan-hydroCHLOROthiazide (AVALIDE) 300-12.5 mg per tablet     empagliflozin (JARDIANCE) 25 mg tablet Take  by mouth daily.  metoprolol tartrate (LOPRESSOR) 50 mg tablet Take 25 mg by mouth daily.  naloxone (NARCAN) 4 mg/actuation nasal spray Use 1 spray intranasally, then discard. Repeat with new spray every 2 min as needed for opioid overdose symptoms, alternating nostrils.  insulin lispro (HUMALOG) 100 unit/mL kwikpen by SubCUTAneous route Before breakfast, lunch, and dinner. Sliding Scale    insulin detemir (LEVEMIR FLEXTOUCH) 100 unit/mL (3 mL) inpn 60 Units by SubCUTAneous route ACB/HS. No current facility-administered medications for this visit. Social History     Tobacco Use   Smoking Status Former Smoker    Packs/day: 2.00    Years: 25.00    Pack years: 50.00    Quit date: 2002    Years since quittin.2   Smokeless Tobacco Never Used       Past Medical History:   Diagnosis Date    Anxiety and depression     anxiety/depression    Arthritis     osteo    CAD (coronary artery disease)     Chronic pain     knee pain,jointpain,muscle pain, Back pain    DM (diabetes mellitus) ([facility] [address]     Kidney mass     mass on kidney    Nausea & vomiting     Neuropathy     Sleep apnea        Past Surgical History:   Procedure Laterality Date    BREAST SURGERY PROCEDURE UNLISTED      breast biopsy    CARDIAC SURG PROCEDURE UNLIST      Bypass    HX CHOLECYSTECTOMY      HX OTHER SURGICAL      left foot surgery.  18srews, 3plates       Family History   Problem Relation Age of Onset    Heart Disease Mother     Stroke Mother     Hypertension Father     Diabetes Sister        Social History     Socioeconomic History    Marital status: SINGLE     Spouse name: Not on file    Number of children: Not on file    Years of education: Not on file    Highest education level: Not on file   Tobacco Use    Smoking status: Former Smoker     Packs/day: 2.00     Years: 25.00     Pack years: 50.00     Quit date: 2002     Years since quittin.2    Smokeless tobacco: Never Used   Substance and Sexual Activity    Alcohol use: No     Alcohol/week: 1.0 standard drinks     Types: 1 Cans of beer per week    Drug use: No    Sexual activity: Never       Review of Systems   Eyes: Negative for blurred vision, double vision and photophobia. Respiratory: Negative for shortness of breath and wheezing. Gastrointestinal: Negative for nausea and vomiting. Musculoskeletal: Positive for joint pain and myalgias. Negative for falls. Neurological: Positive for tingling, sensory change and weakness. Negative for dizziness, tremors, speech change, focal weakness and headaches. Psychiatric/Behavioral: Positive for depression. Negative for hallucinations, memory loss, substance abuse and suicidal ideas. The patient is not nervous/anxious and does not have insomnia. Remainder of comprehensive review is negative. Physical Exam :    Visit Vitals  /62   Pulse 78   Wt 111.6 kg (246 lb)   SpO2 96%   BMI 42.23 kg/m²       General: Well defined, nourished, and groomed individual in no acute distress.    Neck: Supple, nontender, no bruits, no pain with resistance to active range of motion.    Heart: Regular rate and rhythm, no murmurs, rub, or gallop. Normal S1S2. Lungs: Clear to auscultation bilaterally with equal chest expansion, no cough, no wheeze  Musculoskeletal: Extremities revealed no edema and had full range of motion of joints.    Psych: Good mood and bright affect    NEUROLOGICAL EXAMINATION:    Mental Status: Alert and oriented to person, place, and time    Cranial Nerves:    II, III, IV, VI: Visual acuity grossly intact. Visual fields are normal.    Pupils are equal, round, and reactive to light and accommodation.    Extra-ocular movements are full and fluid. Fundoscopic exam was benign, no ptosis or nystagmus.    V-XII: Hearing is grossly intact. Facial features are symmetric, with normal sensation and strength. The palate rises symmetrically and the tongue protrudes midline. Sternocleidomastoids 5/5. Motor Examination: Normal tone, bulk, and strength, 3/5 muscle strength throughout bilateral LE     Coordination: Finger to nose was normal. No resting or intention tremor    Gait and Station: unsteady gait. Reflexes: DTRs 1+ throughout. Neurosensory Exam:  Stocking glove sensory loss to temperature, vibration, and pinprick to the thighs. Results for orders placed or performed during the hospital encounter of 03/26/20   CBC WITH AUTOMATED DIFF   Result Value Ref Range    WBC 10.9 3.6 - 11.0 K/uL    RBC 4.84 3.80 - 5.20 M/uL    HGB 14.7 11.5 - 16.0 g/dL    HCT 45.7 35.0 - 47.0 %    MCV 94.4 80.0 - 99.0 FL    MCH 30.4 26.0 - 34.0 PG    MCHC 32.2 30.0 - 36.5 g/dL    RDW 12.4 11.5 - 14.5 %    PLATELET 945 813 - 821 K/uL    MPV 10.7 8.9 - 12.9 FL    NRBC 0.0 0  WBC    ABSOLUTE NRBC 0.00 0.00 - 0.01 K/uL    NEUTROPHILS 68 32 - 75 %    LYMPHOCYTES 23 12 - 49 %    MONOCYTES 7 5 - 13 %    EOSINOPHILS 1 0 - 7 %    BASOPHILS 1 0 - 1 %    IMMATURE GRANULOCYTES 0 0.0 - 0.5 %    ABS. NEUTROPHILS 7.4 1.8 - 8.0 K/UL    ABS. LYMPHOCYTES 2.5 0.8 - 3.5 K/UL    ABS. MONOCYTES 0.8 0.0 - 1.0 K/UL    ABS. EOSINOPHILS 0.1 0.0 - 0.4 K/UL    ABS. BASOPHILS 0.1 0.0 - 0.1 K/UL    ABS. IMM. GRANS. 0.0 0.00 - 0.04 K/UL    DF AUTOMATED     METABOLIC PANEL, COMPREHENSIVE   Result Value Ref Range    Sodium 141 136 - 145 mmol/L    Potassium 4.0 3.5 - 5.1 mmol/L    Chloride 108 97 - 108 mmol/L    CO2 26 21 - 32 mmol/L    Anion gap 7 5 - 15 mmol/L    Glucose 168 (H) 65 - 100 mg/dL    BUN 18 6 - 20 MG/DL    Creatinine 0.78 0.55 - 1.02 MG/DL    BUN/Creatinine ratio 23 (H) 12 - 20      GFR est AA >60 >60 ml/min/1.73m2    GFR est non-AA >60 >60 ml/min/1.73m2    Calcium 9.0 8.5 - 10.1 MG/DL    Bilirubin, total 0.3 0.2 - 1.0 MG/DL    ALT (SGPT) 18 12 - 78 U/L    AST (SGOT) 13 (L) 15 - 37 U/L    Alk.  phosphatase 98 45 - 117 U/L    Protein, total 8.2 6.4 - 8.2 g/dL    Albumin 3.6 3.5 - 5.0 g/dL    Globulin 4.6 (H) 2.0 - 4.0 g/dL    A-G Ratio 0.8 (L) 1.1 - 2.2     MAGNESIUM   Result Value Ref Range    Magnesium 2.3 1.6 - 2.4 mg/dL   PHOSPHORUS   Result Value Ref Range    Phosphorus 3.3 2.6 - 4.7 MG/DL   NT-PRO BNP   Result Value Ref Range    NT pro- (H) <125 PG/ML   TSH 3RD GENERATION   Result Value Ref Range    TSH 1.82 0.36 - 3.74 uIU/mL   TROPONIN I   Result Value Ref Range    Troponin-I, Qt. <0.05 <0.05 ng/mL   SAMPLES BEING HELD   Result Value Ref Range    SAMPLES BEING HELD 1RED,1BLU     COMMENT        Add-on orders for these samples will be processed based on acceptable specimen integrity and analyte stability, which may vary by analyte. EKG, 12 LEAD, INITIAL   Result Value Ref Range    Ventricular Rate 91 BPM    Atrial Rate 91 BPM    P-R Interval 142 ms    QRS Duration 72 ms    Q-T Interval 384 ms    QTC Calculation (Bezet) 472 ms    Calculated P Axis 58 degrees    Calculated R Axis 38 degrees    Calculated T Axis 65 degrees    Diagnosis       Normal sinus rhythm  Possible Left atrial enlargement  Borderline ECG  When compared with ECG of 05-JAN-2020 11:55,  No significant change was found  Confirmed by Senthil Olivier MD., Tan Ellington ([id number]) on 3/26/2020 2:44:57 PM         Orders Placed This Encounter    apixaban (Eliquis) 5 mg tablet     Sig: Take 5 mg by mouth two (2) times a day.  bumetanide (BUMEX) 1 mg tablet     Sig: Take 1 mg by mouth daily.  diclofenac (VOLTAREN) 1 % gel     Sig: Apply 2-4 g to affected area four (4) times daily. Dispense:  100 g     Refill:  5    pregabalin (LYRICA) 25 mg capsule     Sig: Take 1 Cap by mouth three (3) times daily. Max Daily Amount: 75 mg. Dispense:  90 Cap     Refill:  5    traMADoL (ULTRAM) 50 mg tablet     Sig: Take 1-2 Tabs by mouth nightly as needed for Pain for up to 30 days. Max Daily Amount: 100 mg. Dispense:  60 Tab     Refill:  5       1. Peripheral polyneuropathy    2.  Osteoarthritis, unspecified osteoarthritis type, unspecified site Neuropathy and OA. Has seen orthopedics and pain management and not really seen any changes. Re-start Lyrica 25 mg TID As this really helped last time she just stopped taking it. Keep this and call in 2 weeks to evaluate. Can try Cymbalta if needed for mood and nerve pain moving forward. Also keep with Diclefonac gel topically PRN for arthritis. Keep Tramadol 1-2 tablets nightly for sleep, pain relief PRN   Stay active.                This note will not be viewable in [employer]

## 2021-02-11 NOTE — PATIENT INSTRUCTIONS
Start Lyrica 25 mg three times a day for leg pain pain Call in 2 weeks to let me know how you are doing.

## 2021-09-10 DIAGNOSIS — E11.42 DIABETIC POLYNEUROPATHY ASSOCIATED WITH TYPE 2 DIABETES MELLITUS (HCC): ICD-10-CM

## 2021-09-10 RX ORDER — AMITRIPTYLINE HYDROCHLORIDE 10 MG/1
10-20 TABLET, FILM COATED ORAL
Qty: 60 TABLET | Refills: 5 | Status: SHIPPED | OUTPATIENT
Start: 2021-09-10 | End: 2021-09-20

## 2021-09-20 ENCOUNTER — HOSPITAL ENCOUNTER (INPATIENT)
Age: 76
LOS: 1 days | Discharge: LEFT BEFORE DISCHARGE | DRG: 177 | End: 2021-09-20
Attending: STUDENT IN AN ORGANIZED HEALTH CARE EDUCATION/TRAINING PROGRAM | Admitting: INTERNAL MEDICINE
Payer: MEDICARE

## 2021-09-20 ENCOUNTER — APPOINTMENT (OUTPATIENT)
Dept: GENERAL RADIOLOGY | Age: 76
DRG: 177 | End: 2021-09-20
Attending: STUDENT IN AN ORGANIZED HEALTH CARE EDUCATION/TRAINING PROGRAM
Payer: MEDICARE

## 2021-09-20 VITALS
HEIGHT: 64 IN | TEMPERATURE: 98.9 F | RESPIRATION RATE: 14 BRPM | BODY MASS INDEX: 42 KG/M2 | SYSTOLIC BLOOD PRESSURE: 136 MMHG | HEART RATE: 89 BPM | OXYGEN SATURATION: 92 % | DIASTOLIC BLOOD PRESSURE: 65 MMHG | WEIGHT: 246 LBS

## 2021-09-20 DIAGNOSIS — J96.01 ACUTE RESPIRATORY FAILURE WITH HYPOXIA (HCC): Primary | ICD-10-CM

## 2021-09-20 DIAGNOSIS — U07.1 COVID-19: ICD-10-CM

## 2021-09-20 LAB
ALBUMIN SERPL-MCNC: 2.5 G/DL (ref 3.5–5)
ALBUMIN/GLOB SERPL: 0.5 {RATIO} (ref 1.1–2.2)
ALP SERPL-CCNC: 64 U/L (ref 45–117)
ALT SERPL-CCNC: 29 U/L (ref 12–78)
ANION GAP SERPL CALC-SCNC: 4 MMOL/L (ref 5–15)
AST SERPL-CCNC: 63 U/L (ref 15–37)
ATRIAL RATE: 94 BPM
B PERT DNA SPEC QL NAA+PROBE: NOT DETECTED
BASOPHILS # BLD: 0 K/UL (ref 0–0.1)
BASOPHILS NFR BLD: 0 % (ref 0–1)
BILIRUB SERPL-MCNC: 0.4 MG/DL (ref 0.2–1)
BNP SERPL-MCNC: 256 PG/ML
BORDETELLA PARAPERTUSSIS PCR, BORPAR: NOT DETECTED
BUN SERPL-MCNC: 36 MG/DL (ref 6–20)
BUN/CREAT SERPL: 34 (ref 12–20)
C PNEUM DNA SPEC QL NAA+PROBE: NOT DETECTED
CALCIUM SERPL-MCNC: 7.9 MG/DL (ref 8.5–10.1)
CALCULATED P AXIS, ECG09: 27 DEGREES
CALCULATED R AXIS, ECG10: 12 DEGREES
CALCULATED T AXIS, ECG11: 47 DEGREES
CHLORIDE SERPL-SCNC: 101 MMOL/L (ref 97–108)
CO2 SERPL-SCNC: 24 MMOL/L (ref 21–32)
COMMENT, HOLDF: NORMAL
CREAT SERPL-MCNC: 1.06 MG/DL (ref 0.55–1.02)
CRP SERPL-MCNC: 6.25 MG/DL (ref 0–0.6)
D DIMER PPP FEU-MCNC: 0.71 MG/L FEU (ref 0–0.65)
DIAGNOSIS, 93000: NORMAL
DIFFERENTIAL METHOD BLD: ABNORMAL
EOSINOPHIL # BLD: 0 K/UL (ref 0–0.4)
EOSINOPHIL NFR BLD: 0 % (ref 0–7)
ERYTHROCYTE [DISTWIDTH] IN BLOOD BY AUTOMATED COUNT: 13.3 % (ref 11.5–14.5)
FERRITIN SERPL-MCNC: 1445 NG/ML (ref 8–252)
FLUAV H1 2009 PAND RNA SPEC QL NAA+PROBE: NOT DETECTED
FLUAV H1 RNA SPEC QL NAA+PROBE: NOT DETECTED
FLUAV H3 RNA SPEC QL NAA+PROBE: NOT DETECTED
FLUAV SUBTYP SPEC NAA+PROBE: NOT DETECTED
FLUBV RNA SPEC QL NAA+PROBE: NOT DETECTED
GLOBULIN SER CALC-MCNC: 4.8 G/DL (ref 2–4)
GLUCOSE BLD STRIP.AUTO-MCNC: 330 MG/DL (ref 65–117)
GLUCOSE BLD STRIP.AUTO-MCNC: 392 MG/DL (ref 65–117)
GLUCOSE SERPL-MCNC: 279 MG/DL (ref 65–100)
HADV DNA SPEC QL NAA+PROBE: NOT DETECTED
HCOV 229E RNA SPEC QL NAA+PROBE: NOT DETECTED
HCOV HKU1 RNA SPEC QL NAA+PROBE: NOT DETECTED
HCOV NL63 RNA SPEC QL NAA+PROBE: NOT DETECTED
HCOV OC43 RNA SPEC QL NAA+PROBE: NOT DETECTED
HCT VFR BLD AUTO: 38.5 % (ref 35–47)
HGB BLD-MCNC: 12.6 G/DL (ref 11.5–16)
HMPV RNA SPEC QL NAA+PROBE: NOT DETECTED
HPIV1 RNA SPEC QL NAA+PROBE: NOT DETECTED
HPIV2 RNA SPEC QL NAA+PROBE: NOT DETECTED
HPIV3 RNA SPEC QL NAA+PROBE: NOT DETECTED
HPIV4 RNA SPEC QL NAA+PROBE: NOT DETECTED
IMM GRANULOCYTES # BLD AUTO: 0.1 K/UL (ref 0–0.04)
IMM GRANULOCYTES NFR BLD AUTO: 1 % (ref 0–0.5)
LYMPHOCYTES # BLD: 0.9 K/UL (ref 0.8–3.5)
LYMPHOCYTES NFR BLD: 11 % (ref 12–49)
M PNEUMO DNA SPEC QL NAA+PROBE: NOT DETECTED
MAGNESIUM SERPL-MCNC: 2.2 MG/DL (ref 1.6–2.4)
MCH RBC QN AUTO: 30.5 PG (ref 26–34)
MCHC RBC AUTO-ENTMCNC: 32.7 G/DL (ref 30–36.5)
MCV RBC AUTO: 93.2 FL (ref 80–99)
MONOCYTES # BLD: 0.5 K/UL (ref 0–1)
MONOCYTES NFR BLD: 6 % (ref 5–13)
NEUTS SEG # BLD: 6.7 K/UL (ref 1.8–8)
NEUTS SEG NFR BLD: 82 % (ref 32–75)
NRBC # BLD: 0 K/UL (ref 0–0.01)
NRBC BLD-RTO: 0 PER 100 WBC
P-R INTERVAL, ECG05: 136 MS
PLATELET # BLD AUTO: 167 K/UL (ref 150–400)
PMV BLD AUTO: 10.9 FL (ref 8.9–12.9)
POTASSIUM SERPL-SCNC: 5.2 MMOL/L (ref 3.5–5.1)
PROCALCITONIN SERPL-MCNC: 0.32 NG/ML
PROT SERPL-MCNC: 7.3 G/DL (ref 6.4–8.2)
Q-T INTERVAL, ECG07: 346 MS
QRS DURATION, ECG06: 70 MS
QTC CALCULATION (BEZET), ECG08: 432 MS
RBC # BLD AUTO: 4.13 M/UL (ref 3.8–5.2)
RSV RNA SPEC QL NAA+PROBE: NOT DETECTED
RV+EV RNA SPEC QL NAA+PROBE: NOT DETECTED
SAMPLES BEING HELD,HOLD: NORMAL
SARS-COV-2 PCR, COVPCR: DETECTED
SERVICE CMNT-IMP: ABNORMAL
SERVICE CMNT-IMP: ABNORMAL
SODIUM SERPL-SCNC: 129 MMOL/L (ref 136–145)
TROPONIN I SERPL-MCNC: <0.05 NG/ML
VENTRICULAR RATE, ECG03: 94 BPM
WBC # BLD AUTO: 8.2 K/UL (ref 3.6–11)

## 2021-09-20 PROCEDURE — 86140 C-REACTIVE PROTEIN: CPT

## 2021-09-20 PROCEDURE — 83880 ASSAY OF NATRIURETIC PEPTIDE: CPT

## 2021-09-20 PROCEDURE — 0202U NFCT DS 22 TRGT SARS-COV-2: CPT

## 2021-09-20 PROCEDURE — 65660000000 HC RM CCU STEPDOWN

## 2021-09-20 PROCEDURE — 80053 COMPREHEN METABOLIC PANEL: CPT

## 2021-09-20 PROCEDURE — 36415 COLL VENOUS BLD VENIPUNCTURE: CPT

## 2021-09-20 PROCEDURE — 83735 ASSAY OF MAGNESIUM: CPT

## 2021-09-20 PROCEDURE — 85379 FIBRIN DEGRADATION QUANT: CPT

## 2021-09-20 PROCEDURE — 77030012794 HC KT NSL CANN/HGH TRAN -A

## 2021-09-20 PROCEDURE — 82962 GLUCOSE BLOOD TEST: CPT

## 2021-09-20 PROCEDURE — 93005 ELECTROCARDIOGRAM TRACING: CPT

## 2021-09-20 PROCEDURE — 74011250636 HC RX REV CODE- 250/636: Performed by: INTERNAL MEDICINE

## 2021-09-20 PROCEDURE — 84145 PROCALCITONIN (PCT): CPT

## 2021-09-20 PROCEDURE — 71045 X-RAY EXAM CHEST 1 VIEW: CPT

## 2021-09-20 PROCEDURE — 74011636637 HC RX REV CODE- 636/637: Performed by: INTERNAL MEDICINE

## 2021-09-20 PROCEDURE — 74011250637 HC RX REV CODE- 250/637: Performed by: STUDENT IN AN ORGANIZED HEALTH CARE EDUCATION/TRAINING PROGRAM

## 2021-09-20 PROCEDURE — 85025 COMPLETE CBC W/AUTO DIFF WBC: CPT

## 2021-09-20 PROCEDURE — 74011250637 HC RX REV CODE- 250/637: Performed by: INTERNAL MEDICINE

## 2021-09-20 PROCEDURE — 99285 EMERGENCY DEPT VISIT HI MDM: CPT

## 2021-09-20 PROCEDURE — 84484 ASSAY OF TROPONIN QUANT: CPT

## 2021-09-20 PROCEDURE — 82728 ASSAY OF FERRITIN: CPT

## 2021-09-20 RX ORDER — ACETAMINOPHEN 650 MG/1
650 SUPPOSITORY RECTAL
Status: DISCONTINUED | OUTPATIENT
Start: 2021-09-20 | End: 2021-09-20 | Stop reason: HOSPADM

## 2021-09-20 RX ORDER — ALBUTEROL SULFATE 90 UG/1
2 AEROSOL, METERED RESPIRATORY (INHALATION)
COMMUNITY

## 2021-09-20 RX ORDER — SODIUM CHLORIDE 0.9 % (FLUSH) 0.9 %
5-40 SYRINGE (ML) INJECTION EVERY 8 HOURS
Status: DISCONTINUED | OUTPATIENT
Start: 2021-09-20 | End: 2021-09-20 | Stop reason: HOSPADM

## 2021-09-20 RX ORDER — FUROSEMIDE 40 MG/1
40 TABLET ORAL DAILY
COMMUNITY

## 2021-09-20 RX ORDER — FLUTICASONE FUROATE, UMECLIDINIUM BROMIDE AND VILANTEROL TRIFENATATE 100; 62.5; 25 UG/1; UG/1; UG/1
1 POWDER RESPIRATORY (INHALATION) DAILY
COMMUNITY

## 2021-09-20 RX ORDER — DEXTROSE 50 % IN WATER (D50W) INTRAVENOUS SYRINGE
25-50 AS NEEDED
Status: DISCONTINUED | OUTPATIENT
Start: 2021-09-20 | End: 2021-09-20 | Stop reason: HOSPADM

## 2021-09-20 RX ORDER — ZINC SULFATE 50(220)MG
1 CAPSULE ORAL DAILY
Status: DISCONTINUED | OUTPATIENT
Start: 2021-09-21 | End: 2021-09-20 | Stop reason: HOSPADM

## 2021-09-20 RX ORDER — MAGNESIUM SULFATE 100 %
4 CRYSTALS MISCELLANEOUS AS NEEDED
Status: DISCONTINUED | OUTPATIENT
Start: 2021-09-20 | End: 2021-09-20 | Stop reason: HOSPADM

## 2021-09-20 RX ORDER — AZITHROMYCIN 250 MG/1
500 TABLET, FILM COATED ORAL DAILY
Status: DISCONTINUED | OUTPATIENT
Start: 2021-09-21 | End: 2021-09-20 | Stop reason: HOSPADM

## 2021-09-20 RX ORDER — MELATONIN
1000 DAILY
Status: DISCONTINUED | OUTPATIENT
Start: 2021-09-21 | End: 2021-09-20 | Stop reason: HOSPADM

## 2021-09-20 RX ORDER — IPRATROPIUM BROMIDE AND ALBUTEROL SULFATE 2.5; .5 MG/3ML; MG/3ML
3 SOLUTION RESPIRATORY (INHALATION)
Status: DISCONTINUED | OUTPATIENT
Start: 2021-09-20 | End: 2021-09-20

## 2021-09-20 RX ORDER — TRAMADOL HYDROCHLORIDE 50 MG/1
50 TABLET ORAL
COMMUNITY

## 2021-09-20 RX ORDER — ONDANSETRON 4 MG/1
4 TABLET, ORALLY DISINTEGRATING ORAL
Status: DISCONTINUED | OUTPATIENT
Start: 2021-09-20 | End: 2021-09-20 | Stop reason: HOSPADM

## 2021-09-20 RX ORDER — ONDANSETRON 2 MG/ML
4 INJECTION INTRAMUSCULAR; INTRAVENOUS
Status: DISCONTINUED | OUTPATIENT
Start: 2021-09-20 | End: 2021-09-20 | Stop reason: HOSPADM

## 2021-09-20 RX ORDER — INSULIN LISPRO 100 [IU]/ML
INJECTION, SOLUTION INTRAVENOUS; SUBCUTANEOUS
Status: DISCONTINUED | OUTPATIENT
Start: 2021-09-20 | End: 2021-09-20 | Stop reason: HOSPADM

## 2021-09-20 RX ORDER — PREGABALIN 50 MG/1
50 CAPSULE ORAL
COMMUNITY

## 2021-09-20 RX ORDER — ASCORBIC ACID 500 MG
500 TABLET ORAL 2 TIMES DAILY
Status: DISCONTINUED | OUTPATIENT
Start: 2021-09-20 | End: 2021-09-20 | Stop reason: HOSPADM

## 2021-09-20 RX ORDER — POLYETHYLENE GLYCOL 3350 17 G/17G
17 POWDER, FOR SOLUTION ORAL DAILY PRN
Status: DISCONTINUED | OUTPATIENT
Start: 2021-09-20 | End: 2021-09-20 | Stop reason: HOSPADM

## 2021-09-20 RX ORDER — AMLODIPINE BESYLATE 2.5 MG/1
2.5 TABLET ORAL DAILY
COMMUNITY

## 2021-09-20 RX ORDER — OXYCODONE AND ACETAMINOPHEN 5; 325 MG/1; MG/1
1 TABLET ORAL ONCE
Status: COMPLETED | OUTPATIENT
Start: 2021-09-20 | End: 2021-09-20

## 2021-09-20 RX ORDER — CALCIUM CARB/MAGNESIUM CARB 311-232MG
5 TABLET ORAL
Status: DISCONTINUED | OUTPATIENT
Start: 2021-09-20 | End: 2021-09-20 | Stop reason: HOSPADM

## 2021-09-20 RX ORDER — DICLOFENAC SODIUM 10 MG/G
2 GEL TOPICAL
COMMUNITY

## 2021-09-20 RX ORDER — ENOXAPARIN SODIUM 100 MG/ML
40 INJECTION SUBCUTANEOUS DAILY
Status: DISCONTINUED | OUTPATIENT
Start: 2021-09-20 | End: 2021-09-20

## 2021-09-20 RX ORDER — LANOLIN ALCOHOL/MO/W.PET/CERES
3 CREAM (GRAM) TOPICAL
Status: DISCONTINUED | OUTPATIENT
Start: 2021-09-20 | End: 2021-09-20

## 2021-09-20 RX ORDER — ACETAMINOPHEN 325 MG/1
650 TABLET ORAL
Status: DISCONTINUED | OUTPATIENT
Start: 2021-09-20 | End: 2021-09-20 | Stop reason: HOSPADM

## 2021-09-20 RX ORDER — DEXAMETHASONE SODIUM PHOSPHATE 4 MG/ML
6 INJECTION, SOLUTION INTRA-ARTICULAR; INTRALESIONAL; INTRAMUSCULAR; INTRAVENOUS; SOFT TISSUE EVERY 24 HOURS
Status: DISCONTINUED | OUTPATIENT
Start: 2021-09-20 | End: 2021-09-20 | Stop reason: HOSPADM

## 2021-09-20 RX ORDER — PREGABALIN 25 MG/1
50 CAPSULE ORAL
Status: DISCONTINUED | OUTPATIENT
Start: 2021-09-20 | End: 2021-09-20 | Stop reason: HOSPADM

## 2021-09-20 RX ORDER — SODIUM CHLORIDE 0.9 % (FLUSH) 0.9 %
5-40 SYRINGE (ML) INJECTION AS NEEDED
Status: DISCONTINUED | OUTPATIENT
Start: 2021-09-20 | End: 2021-09-20 | Stop reason: HOSPADM

## 2021-09-20 RX ORDER — ACETAMINOPHEN 325 MG/1
650 TABLET ORAL
COMMUNITY

## 2021-09-20 RX ORDER — ALBUTEROL SULFATE 2.5 MG/.5ML
2.5 SOLUTION RESPIRATORY (INHALATION)
COMMUNITY

## 2021-09-20 RX ADMIN — INSULIN LISPRO 10 UNITS: 100 INJECTION, SOLUTION INTRAVENOUS; SUBCUTANEOUS at 11:58

## 2021-09-20 RX ADMIN — INSULIN HUMAN 60 UNITS: 100 INJECTION, SUSPENSION SUBCUTANEOUS at 16:50

## 2021-09-20 RX ADMIN — APIXABAN 5 MG: 5 TABLET, FILM COATED ORAL at 16:51

## 2021-09-20 RX ADMIN — OXYCODONE HYDROCHLORIDE AND ACETAMINOPHEN 500 MG: 500 TABLET ORAL at 16:51

## 2021-09-20 RX ADMIN — DEXAMETHASONE SODIUM PHOSPHATE 6 MG: 4 INJECTION, SOLUTION INTRAMUSCULAR; INTRAVENOUS at 11:58

## 2021-09-20 RX ADMIN — Medication 10 ML: at 16:51

## 2021-09-20 RX ADMIN — INSULIN LISPRO 15 UNITS: 100 INJECTION, SOLUTION INTRAVENOUS; SUBCUTANEOUS at 16:50

## 2021-09-20 RX ADMIN — Medication 10 ML: at 11:59

## 2021-09-20 RX ADMIN — OXYCODONE HYDROCHLORIDE AND ACETAMINOPHEN 1 TABLET: 5; 325 TABLET ORAL at 10:12

## 2021-09-20 NOTE — ACP (ADVANCE CARE PLANNING)
[address] Adult  Hospitalist Group                                      Advance Care Planning Note    Name: Mark Darling  YOB: 1945  MRN: [id number]  Admission Date: 9/20/2021  9:11 AM    Date of discussion: 9/20/2021    Active Diagnoses:    Hospital Problems  Date Reviewed: 2/4/2020        Codes Class Noted POA    Acute hypoxemic respiratory failure due to COVID-19 [facility]) ICD-10-CM: U07.1, J96.01  ICD-9-CM: 518.81, 079.89, 799.02  9/20/2021 Unknown              These active diagnoses are of sufficient risk that focused discussion on advance care planning is indicated in order to allow the patient to thoughtfully consider personal goals of care, and if situations arise that prevent the ability to personally give input, to ensure appropriate representation of their personal desires for different levels and aggressiveness of care. Discussion:     Persons present and participating in discussion: Jaylan Richards MD    Topics Discussed:  Patient's medical condition and diagnosis: [ x ] yes [  ] no   Surrogate decision maker: [ x ] yes [  ] no   Patient's current physical function/cognitive function/frailty: [ x ] yes [  ] no   Code Status: [x  ] yes [  ] no   Artificial Nutrition / Dialysis / Non-Invasive Ventilation / Blood Transfusion: [ x ] yes [  ] no  Potential Resources for home (durable medical equipment, home nursing, home O2): [x  ] yes [  ] no    Overview of Discussion: Pt would not like to be on a ventilator and would not want compressions. She understands she has COVID and as at high risk to do poorly    Time Spent:     Total time spent face-to-face in education and discussion: 16 minutes.      Haily Alexander MD  Date of Service:  9/20/2021  3:14 PM

## 2021-09-20 NOTE — PROGRESS NOTES
[facility]: MED RECONCILIATION    Comments/Recommendations:   Reviewed PTA medications with patient over the phone. Patient is a good historian. Patient started prednisone 20 mg BID on 9/16/21 and last dose 9/19/21. She reports uncontrolled BG while on steroids and her rapid acting insulin was increased to ~15 units TIDAC. Patient has not taken any medications including insulin and eliquis yet today    Medications added:     Trelegy  Tramadol  Amlodipine  APAP  Albuterol (HFA and nebulizer)  Furosemide    Medications removed:    Trazodone  Amitriptyline  Bumex  Jardiance  Metoprolol  Naloxone    Medications adjusted:    Levemir- 62 units BID  Lispro 10-15 units TIDAC  Lyrica 50 mg BID only as needed    Information obtained from: Patient, Chart review    Allergies: Byetta [exenatide] and Metformin    Prior to Admission Medications:     Medication Documentation Review Audit       Reviewed by Divya Young, 68 Jennifer Woo (Pharmacist) on 09/20/21 at 1356      Medication Sig Documenting Provider Last Dose Status Taking?   acetaminophen (TYLENOL) 325 mg tablet Take 650 mg by mouth every six (6) hours as needed for Pain. Provider, Historical 9/13/2021 Active Yes   albuterol (PROVENTIL HFA, VENTOLIN HFA, PROAIR HFA) 90 mcg/actuation inhaler Take 2 Puffs by inhalation every four (4) hours as needed for Wheezing or Shortness of Breath. Provider, Historical 9/19/2021 Active Yes   albuterol sulfate (PROVENTIL;VENTOLIN) 2.5 mg/0.5 mL nebu nebulizer solution 2.5 mg by Nebulization route every four (4) hours as needed for Wheezing, Shortness of Breath or Respiratory Distress. Provider, Historical 9/19/2021 Active Yes   amLODIPine (NORVASC) 2.5 mg tablet Take 2.5 mg by mouth daily. Provider, Historical 9/19/2021 Active Yes   apixaban (Eliquis) 5 mg tablet Take 5 mg by mouth two (2) times a day. Provider, Historical 9/19/2021 PM Active Yes   diclofenac (Voltaren) 1 % gel Apply 2 g to affected area four (4) times daily as needed for Pain. Provider, Historical 9/13/2021 Active Yes   fluticasone-umeclidinium-vilanterol (Trelegy Ellipta) 100-62.5-25 mcg inhaler Take 1 Puff by inhalation daily. Provider, Historical 9/19/2021 Active Yes   furosemide (LASIX) 40 mg tablet Take 40 mg by mouth daily. Provider, Historical 9/19/2021 Active Yes   insulin detemir (LEVEMIR FLEXTOUCH) 100 unit/mL (3 mL) inpn 62 Units by SubCUTAneous route ACB/HS. Levemir 62 units twice daily Provider, Historical 9/19/2021 2100 Active Yes           Med Note (Tripp Hunter   Tue Nov 13, 2018 11:03 AM) . insulin lispro (HUMALOG) 100 unit/mL kwikpen 10-15 Units by SubCUTAneous route Before breakfast, lunch, and dinner. Sliding scale. Patient reports history of needing higher doses while on steroids. Completed prednisone PO 9/19/21. Provider, Historical 9/19/2021 1700 Active Yes           Med Note Eleln Mckeon Sep 20, 2021  1:52 PM)     irbesartan-hydroCHLOROthiazide (AVALIDE) 300-12.5 mg per tablet Take 1 Tablet by mouth daily. Provider, Historical 9/19/2021 Active Yes   pregabalin (Lyrica) 50 mg capsule Take 50 mg by mouth two (2) times daily as needed for Pain. Provider, Historical 9/13/2021 Active Yes   traMADoL (ULTRAM) 50 mg tablet Take 50 mg by mouth every six (6) hours as needed for Pain (Severe pain).  Provider, Historical 9/13/2021 Active Yes                  Breezy Reyes, PHARMD   Contact: [phone]

## 2021-09-20 NOTE — ED NOTES
Notified MD Mantilla pt's O2 saturations in 80's on RA. 88-91% on 6L NC. MD stated OK for midflow. RT @ bedside. Pt placed on midflow O2 @ 11L.

## 2021-09-20 NOTE — ED PROVIDER NOTES
Patient is a 22-year-old female who was Covid positive last Tuesday and has felt poorly since that time. Patient has complex medical history, CAD, bypass, BiPAP at night. Patient states she has oxygen at home for as needed use up to 4 L. However she says she never uses it up until she became sick a week ago. Patient has nausea, vomiting, myalgias, worsening shortness of breath. Patient also has history of CHF. Patient's main concern is her pain. She wants to know how to get Covid out of her body. The history is provided by the patient, the EMS personnel and medical records. Generalized Body Aches  This is a new problem. The current episode started more than 1 week ago. The problem occurs constantly. The problem has been gradually worsening. Associated symptoms include shortness of breath. The symptoms are aggravated by exertion. The symptoms are relieved by rest. She has tried nothing for the symptoms. Positive For Covid-19  Associated symptoms: chills and myalgias         Past Medical History:   Diagnosis Date    Anxiety and depression     anxiety/depression    Arthritis     osteo    CAD (coronary artery disease)     Chronic pain     knee pain,jointpain,muscle pain, Back pain    DM (diabetes mellitus) ([facility] [address]     Kidney mass     mass on kidney    Nausea & vomiting     Neuropathy     Sleep apnea        Past Surgical History:   Procedure Laterality Date    HX CHOLECYSTECTOMY      HX OTHER SURGICAL      left foot surgery.  18srews, 3plates    NE BREAST SURGERY PROCEDURE UNLISTED      breast biopsy    NE CARDIAC SURG PROCEDURE UNLIST      Bypass         Family History:   Problem Relation Age of Onset    Heart Disease Mother     Stroke Mother     Hypertension Father     Diabetes Sister        Social History     Socioeconomic History    Marital status: SINGLE     Spouse name: Not on file    Number of children: Not on file    Years of education: Not on file    Highest education level: Not on file   Occupational History    Not on file   Tobacco Use    Smoking status: Former Smoker     Packs/day: 2.00     Years: 25.00     Pack years: 50.00     Quit date: 2002     Years since quittin.8    Smokeless tobacco: Never Used   Substance and Sexual Activity    Alcohol use: No     Alcohol/week: 1.0 standard drinks     Types: 1 Cans of beer per week    Drug use: No    Sexual activity: Never   Other Topics Concern    Not on file   Social History Narrative    Not on file     Social Determinants of Health     Financial Resource Strain:     Difficulty of Paying Living Expenses:    Food Insecurity:     Worried About Running Out of Food in the Last Year:     [address] in the Last Year:    Transportation Needs:     Lack of Transportation (Medical):  Lack of Transportation (Non-Medical):    Physical Activity:     Days of Exercise per Week:     Minutes of Exercise per Session:    Stress:     Feeling of Stress :    Social Connections:     Frequency of Communication with Friends and Family:     Frequency of Social Gatherings with Friends and Family:     Attends Protestant Services:     Active Member of Clubs or Organizations:     Attends Club or Organization Meetings:     Marital Status:    Intimate Partner Violence:     Fear of Current or Ex-Partner:     Emotionally Abused:     Physically Abused:     Sexually Abused: ALLERGIES: Byetta [exenatide] and Metformin    Review of Systems   Constitutional: Positive for activity change, appetite change, chills, diaphoresis, fatigue and fever. HENT: Negative. Eyes: Negative. Respiratory: Positive for shortness of breath. Cardiovascular: Negative. Gastrointestinal: Negative. Endocrine: Negative. Genitourinary: Negative. Musculoskeletal: Positive for myalgias. Skin: Negative. Allergic/Immunologic: Negative. Neurological: Negative. Hematological: Negative. Psychiatric/Behavioral: Negative. Vitals:    09/20/21 0954   BP: (!) 148/55   Pulse: 94   Resp: 23   Temp: 100.3 °F (37.9 °C)   SpO2: (!) 82%   Weight: 111.6 kg (246 lb)   Height: 5' 4\" (1.626 m)            Physical Exam  Vitals and nursing note reviewed. Constitutional:       General: She is not in acute distress. Appearance: Normal appearance. She is obese. She is ill-appearing. HENT:      Head: Normocephalic and atraumatic. Right Ear: External ear normal.      Left Ear: External ear normal.      Nose: Nose normal.      Mouth/Throat:      Mouth: Mucous membranes are moist.      Pharynx: Oropharynx is clear. No posterior oropharyngeal erythema. Eyes:      Extraocular Movements: Extraocular movements intact. Conjunctiva/sclera: Conjunctivae normal.   Cardiovascular:      Rate and Rhythm: Tachycardia present. Pulses: Normal pulses. Heart sounds: Normal heart sounds. Pulmonary:      Effort: Pulmonary effort is normal. Tachypnea present. Breath sounds: Normal breath sounds. Chest:      Chest wall: No deformity or tenderness. Abdominal:      General: Abdomen is flat. There is no distension. Tenderness: There is no abdominal tenderness. Musculoskeletal:         General: No deformity or signs of injury. Normal range of motion. Cervical back: Normal range of motion and neck supple. No tenderness. Skin:     General: Skin is warm and dry. Capillary Refill: Capillary refill takes less than 2 seconds. Neurological:      General: No focal deficit present. Mental Status: She is alert and oriented to person, place, and time. Psychiatric:         Mood and Affect: Mood normal.         Behavior: Behavior normal.          [facility]  ED Course as of Sep 20 1039   Mon Sep 20, 2021   1020 EKG interpretation:   Rhythm: normal sinus rhythm; and regular . Rate (approx.): 94; Axis: normal; Intervals: normal ; ST/T wave: normal; EKG documented and interpreted by Frank Fierro.  Best Nelson MD, ED MD.      [AL]      ED Course User Index  [AL] Miguel Montiel MD     LABORATORY RESULTS:  Labs Reviewed   CBC WITH AUTOMATED DIFF - Abnormal; Notable for the following components:       Result Value    NEUTROPHILS 82 (*)     LYMPHOCYTES 11 (*)     IMMATURE GRANULOCYTES 1 (*)     ABS. IMM. GRANS. 0.1 (*)     All other components within normal limits   METABOLIC PANEL, COMPREHENSIVE   NT-PRO BNP   TROPONIN I   MAGNESIUM       IMAGING RESULTS:  XR CHEST PORT   Final Result      Low lung volumes with left greater than right lower lung opacities that may   represent atelectasis, edema, or infection, including atypical/viral etiologies. MEDICATIONS GIVEN:  Medications   oxyCODONE-acetaminophen (PERCOCET) 5-325 mg per tablet 1 Tablet (1 Tablet Oral Given 9/20/21 1012)       Differential diagnosis: COVID-19, COPD exacerbation, CHF exacerbation, ACS, hypoxic respiratory failure    ED physician interpretation of imaging: Chest x-ray with opacities concerning for bilateral infection. ED physician interpretation of EKG: No STEMI. See my interpretation of EKG reading and ED course above  ED physician interpretation of laboratory results: Patient's troponin unmeasurable. BNP is mildly elevated at 256, 2020 it was 334. MDM: Patient is a 68-year-old female with complex medical history who is Covid positive presenting the ED with worsening oxygen requirement consistent with hypoxic respiratory failure due to Covid pneumonia requiring hospitalization for mid flow oxygen and further treatment for Covid pneumonia. Patient vital signs are stable but with abnormalities. Patient has significant hypoxia on her home max of 4 L. Patient requiring mid flow greater than 6 L of oxygen to maintain sats in the low 90s. Patient has significant tach tachypnea and tachycardia    Patient given oral Percocet for pain as she stated Tylenol and Motrin are not working for her.  Discussed admission with patient, she is comfortable with admission. DISPOSITION: Admitted    Perfect Serve Consult for Admission  10:38 AM    ED Room Number: ER03/03  Patient Name and age:  Sergio Dior 78 y.o.  female  Working Diagnosis:   1. Acute respiratory failure with hypoxia ([address]    2. COVID-19        COVID-19 Suspicion:  yes  Sepsis present:  no  Reassessment needed: yes  Code Status:  Full Code  Readmission: no  Isolation Requirements:  yes  Recommended Level of Care:  med/surg  Department:  [facility] ED - [phone]    Other: Covid pneumonia with increasing oxygen requirement. 6 L now transition to mid flow. Krista Level.  Nikolas Sheldon MD        Procedures

## 2021-09-20 NOTE — ED NOTES
MD Katerine Cobos spoke w/ pt regarding her request to leave AMA. Pt signed informed refusal form- see scanned copy in media. IV removed, pt dressed. Pt awaiting sister to arrive w/ her O2 concentrator for .

## 2021-09-20 NOTE — H&P
Hospitalist Admission Note    NAME: Sergio Dior   :  1945   MRN:  [id number]     Date/Time:  2021 3:00 PM    Patient PCP: None  ________________________________________________________________________  Given the patient's current clinical presentation, I have a high level of concern for decompensation if discharged from the emergency department. Complex decision making was performed, which includes reviewing the patient's available past medical records, laboratory results, and x-ray films. My assessment of this patient's clinical condition and my plan of care is as follows. Assessment / Plan:    #Acute on Chronic Hypoxemic Respiratory Failure: Baseline 4L nc now requiring 11L. Most likely 2/2 COVID-19, though awaiting PCR. Procal mildly elevated. On apix so doubt PE. + COPD exacerbation. Dimer reassuring, CRP just < 7   - Dex 6mg  -    - Azith for comorbid COPD exac w/ borderline positive procal   - Apix for DVT ppx (home med)   - Supportive care, multivits   - Trend markers    #Hyponatremia: Pseudo with mild component of volume depletion   - Gentle fluids    #DM2: Poorly controlled when on steroids. Home dose is detemir 62u BID, SSI; had been on 15u TIDAC while on steroids as well   - NPH 50u BID + SSI fo rnow    #COPD Exac: As above, + nebs. On trelegy at home    #HTN: Amlodipine at home; furosemide    #Obesity: RF for the above        I have personally reviewed the radiographs, laboratory data in Epic and decisions and statements above are based partially on this personal interpretation. Code Status: DNR/DNI  DVT Prophylaxis: Apixaban  GI Prophylaxis: not indicated       Subjective:   CHIEF COMPLAINT: \"dyspnea\"    HISTORY OF PRESENT ILLNESS:     Claudeen Greaser is a 78 y.o.  F with PMHx obesity, DM, COPD, who presents with 8 days of cough, sob, johnson, fever, malaise. She is unvaccinated and reported testing positive for COVID last .  She wears 4L nc at baseline and in ER, she requires 11L, resting comfortably. Past Medical History:   Diagnosis Date    Anxiety and depression     anxiety/depression    Arthritis     osteo    CAD (coronary artery disease)     Chronic pain     knee pain,jointpain,muscle pain, Back pain    DM (diabetes mellitus) ([facility] [address]     Kidney mass     mass on kidney    Nausea & vomiting     Neuropathy     Sleep apnea       Past Surgical History:   Procedure Laterality Date    HX CHOLECYSTECTOMY      HX OTHER SURGICAL      left foot surgery. 18srews, 3plates    ND BREAST SURGERY PROCEDURE UNLISTED      breast biopsy    ND CARDIAC SURG PROCEDURE UNLIST      Bypass     Social History     Tobacco Use    Smoking status: Former Smoker     Packs/day: 2.00     Years: 25.00     Pack years: 50.00     Quit date: 2002     Years since quittin.8    Smokeless tobacco: Never Used   Substance Use Topics    Alcohol use: No     Alcohol/week: 1.0 standard drinks     Types: 1 Cans of beer per week      Family History   Problem Relation Age of Onset    Heart Disease Mother     Stroke Mother     Hypertension Father     Diabetes Sister         Allergies   Allergen Reactions    Byetta [Exenatide] Nausea and Vomiting    Metformin Nausea and Vomiting        Prior to Admission medications    Medication Sig Start Date End Date Taking? Authorizing Provider   acetaminophen (TYLENOL) 325 mg tablet Take 650 mg by mouth every six (6) hours as needed for Pain. Yes Provider, Historical   diclofenac (Voltaren) 1 % gel Apply 2 g to affected area four (4) times daily as needed for Pain. Yes Provider, Historical   amLODIPine (NORVASC) 2.5 mg tablet Take 2.5 mg by mouth daily. Yes Provider, Historical   fluticasone-umeclidinium-vilanterol (Trelegy Ellipta) 100-62.5-25 mcg inhaler Take 1 Puff by inhalation daily.    Yes Provider, Historical   albuterol (PROVENTIL HFA, VENTOLIN HFA, PROAIR HFA) 90 mcg/actuation inhaler Take 2 Puffs by inhalation every four (4) hours as needed for Wheezing or Shortness of Breath. Yes Provider, Historical   albuterol sulfate (PROVENTIL;VENTOLIN) 2.5 mg/0.5 mL nebu nebulizer solution 2.5 mg by Nebulization route every four (4) hours as needed for Wheezing, Shortness of Breath or Respiratory Distress. Yes Provider, Historical   traMADoL (ULTRAM) 50 mg tablet Take 50 mg by mouth every six (6) hours as needed for Pain (Severe pain). Yes Provider, Historical   furosemide (LASIX) 40 mg tablet Take 40 mg by mouth daily. Yes Provider, Historical   pregabalin (Lyrica) 50 mg capsule Take 50 mg by mouth two (2) times daily as needed for Pain. Yes Provider, Historical   apixaban (Eliquis) 5 mg tablet Take 5 mg by mouth two (2) times a day. Yes Provider, Historical   irbesartan-hydroCHLOROthiazide (AVALIDE) 300-12.5 mg per tablet Take 1 Tablet by mouth daily. 7/25/20  Yes Provider, Historical   insulin lispro (HUMALOG) 100 unit/mL kwikpen 10-15 Units by SubCUTAneous route Before breakfast, lunch, and dinner. Sliding scale. Patient reports history of needing higher doses while on steroids. Completed prednisone PO 9/19/21. Yes Provider, Historical   insulin detemir (LEVEMIR FLEXTOUCH) 100 unit/mL (3 mL) inpn 62 Units by SubCUTAneous route ACB/HS.  Levemir 62 units twice daily   Yes Provider, Historical     REVIEW OF SYSTEMS:  See HPI for details  General: + fever, chills, sweats, weakness, weight loss  Eyes: negative for blurred vision, eye pain, loss of vision, diplopia  Ear Nose and Throat: negative for rhinorrhea, pharyngitis, otalgia, tinnitus, speech or swallowing difficulties  Respiratory:  negative for pleuritic pain, +cough, sputum production, wheezing, +SOB, KAYE  Cardiology:  negative for chest pain, palpitations, orthopnea, PND, edema, syncope   Gastrointestinal: negative for abdominal pain, N/V, dysphagia, change in bowel habits, bleeding  Genitourinary: negative for frequency, urgency, dysuria, hematuria, incontinence  Muskuloskeletal : negative for arthralgia, myalgia  Hematology: negative for easy bruising, bleeding, lymphadenopathy  Dermatological: negative for rash, ulceration, mole change, new lesion  Endocrine: negative for hot flashes or polydipsia  Neurological: negative for headache, dizziness, confusion, focal weakness, paresthesia, memory loss, gait disturbance  Psychological: negative for anxiety, depression, agitation      Objective:   VITALS:    Visit Vitals  BP (!) 119/54   Pulse 91   Temp 100.3 °F (37.9 °C)   Resp 20   Ht 5' 4\" (1.626 m)   Wt 111.6 kg (246 lb)   SpO2 95%   BMI 42.23 kg/m²     PHYSICAL EXAM:    Physical Exam:    Gen: Well-developed, well-nourished, in no acute distress  HEENT:  Pink conjunctivae, PERRL, hearing intact to voice, moist mucous membranes  Neck: Supple, without masses, thyroid non-tender  Resp: No accessory muscle use  Card: No murmurs, normal S1, S2 without thrills, bruits or peripheral edema  Abd:  Soft, non-tender, non-distended, normoactive bowel sounds are present, no palpable organomegaly and no detectable hernias  Lymph:  No cervical or inguinal adenopathy  Musc: No cyanosis or clubbing  Skin: No rashes or ulcers, skin turgor is good  Neuro:  Cranial nerves are grossly intact, no focal motor weakness, follows commands appropriately  Psych:  Good insight, oriented to person, place and time, alert          _______________________________________________________________________  Care Plan discussed with:    Comments   Patient x Discussed with patient in room.  POC outlined and Questions answered    Family      RN x    Care Manager                    Consultant:  anthony VEGA MD   _______________________________________________________________________  Recommended Disposition:   Home with Family x   HH/PT/OT/RN    SNF/LTC    KAROLINA    ________________________________________________________________________  TOTAL TIME:  60 Minutes        Comments   >50% of visit spent in counseling and coordination of care  Chart reviewed  Discussion with patient and/or family and questions answered     ________________________________________________________________________  Signed: Christin Mcrae MD    This note will not be viewable in [address] Procedures: see electronic medical records for all procedures/Xrays and details which were not copied into this note but were reviewed prior to creation of Plan. LAB DATA REVIEWED:    Recent Results (from the past 24 hour(s))   EKG, 12 LEAD, INITIAL    Collection Time: 09/20/21 10:04 AM   Result Value Ref Range    Ventricular Rate 94 BPM    Atrial Rate 94 BPM    P-R Interval 136 ms    QRS Duration 70 ms    Q-T Interval 346 ms    QTC Calculation (Bezet) 432 ms    Calculated P Axis 27 degrees    Calculated R Axis 12 degrees    Calculated T Axis 47 degrees    Diagnosis       Normal sinus rhythm  Normal ECG  When compared with ECG of 26-MAR-2020 04:22,  No significant change was found     CBC WITH AUTOMATED DIFF    Collection Time: 09/20/21 10:13 AM   Result Value Ref Range    WBC 8.2 3.6 - 11.0 K/uL    RBC 4.13 3.80 - 5.20 M/uL    HGB 12.6 11.5 - 16.0 g/dL    HCT 38.5 35.0 - 47.0 %    MCV 93.2 80.0 - 99.0 FL    MCH 30.5 26.0 - 34.0 PG    MCHC 32.7 30.0 - 36.5 g/dL    RDW 13.3 11.5 - 14.5 %    PLATELET 889 904 - 878 K/uL    MPV 10.9 8.9 - 12.9 FL    NRBC 0.0 0  WBC    ABSOLUTE NRBC 0.00 0.00 - 0.01 K/uL    NEUTROPHILS 82 (H) 32 - 75 %    LYMPHOCYTES 11 (L) 12 - 49 %    MONOCYTES 6 5 - 13 %    EOSINOPHILS 0 0 - 7 %    BASOPHILS 0 0 - 1 %    IMMATURE GRANULOCYTES 1 (H) 0.0 - 0.5 %    ABS. NEUTROPHILS 6.7 1.8 - 8.0 K/UL    ABS. LYMPHOCYTES 0.9 0.8 - 3.5 K/UL    ABS. MONOCYTES 0.5 0.0 - 1.0 K/UL    ABS. EOSINOPHILS 0.0 0.0 - 0.4 K/UL    ABS. BASOPHILS 0.0 0.0 - 0.1 K/UL    ABS. IMM.  GRANS. 0.1 (H) 0.00 - 0.04 K/UL    DF AUTOMATED     METABOLIC PANEL, COMPREHENSIVE    Collection Time: 09/20/21 10:13 AM   Result Value Ref Range    Sodium 129 (L) 136 - 145 mmol/L    Potassium 5.2 (H) 3.5 - 5.1 mmol/L    Chloride 101 97 - 108 mmol/L    CO2 24 21 - 32 mmol/L    Anion gap 4 (L) 5 - 15 mmol/L    Glucose 279 (H) 65 - 100 mg/dL    BUN 36 (H) 6 - 20 MG/DL    Creatinine 1.06 (H) 0.55 - 1.02 MG/DL    BUN/Creatinine ratio 34 (H) 12 - 20      GFR est AA >60 >60 ml/min/1.73m2    GFR est non-AA 51 (L) >60 ml/min/1.73m2    Calcium 7.9 (L) 8.5 - 10.1 MG/DL    Bilirubin, total 0.4 0.2 - 1.0 MG/DL    ALT (SGPT) 29 12 - 78 U/L    AST (SGOT) 63 (H) 15 - 37 U/L    Alk. phosphatase 64 45 - 117 U/L    Protein, total 7.3 6.4 - 8.2 g/dL    Albumin 2.5 (L) 3.5 - 5.0 g/dL    Globulin 4.8 (H) 2.0 - 4.0 g/dL    A-G Ratio 0.5 (L) 1.1 - 2.2     NT-PRO BNP    Collection Time: 09/20/21 10:13 AM   Result Value Ref Range    NT pro- (H) <125 PG/ML   TROPONIN I    Collection Time: 09/20/21 10:13 AM   Result Value Ref Range    Troponin-I, Qt. <0.05 <0.05 ng/mL   MAGNESIUM    Collection Time: 09/20/21 10:13 AM   Result Value Ref Range    Magnesium 2.2 1.6 - 2.4 mg/dL   GLUCOSE, POC    Collection Time: 09/20/21 11:57 AM   Result Value Ref Range    Glucose (POC) 330 (H) 65 - 117 mg/dL    Performed by Apoorva SAENZ    D DIMER    Collection Time: 09/20/21 12:06 PM   Result Value Ref Range    D-dimer 0.71 (H) 0.00 - 0.65 mg/L FEU   SAMPLES BEING HELD    Collection Time: 09/20/21 12:06 PM   Result Value Ref Range    SAMPLES BEING HELD 1 RED,1SST     COMMENT        Add-on orders for these samples will be processed based on acceptable specimen integrity and analyte stability, which may vary by analyte.    C REACTIVE PROTEIN, QT    Collection Time: 09/20/21 12:06 PM   Result Value Ref Range    C-Reactive protein 6.25 (H) 0.00 - 0.60 mg/dL   PROCALCITONIN    Collection Time: 09/20/21 12:06 PM   Result Value Ref Range    Procalcitonin 0.32 ng/mL

## 2021-09-20 NOTE — ED TRIAGE NOTES
Pt arrives via EMS w/ c/c of generalized body aches. Pt reports she was COVID + as of last Sunday. Pt denies COVID vaccination. Hx COPD & triple bypass in 2019.

## 2021-09-20 NOTE — PROGRESS NOTES
CARE MANAGEMENT NOTE      CM attempted to reach Pt on room and cell phone to complete initial assessment, however, Pt did not answer either phone. CM will follow up when available. 5:24 PM  CM notified that Pt is leaving AMA.   _____________________________________  MARY Jeffries - Care Management  9/20/2021   11:39 AM

## 2021-09-20 NOTE — ED NOTES
Pt requesting RVP results. Pt aware SARS COV2 is detected- no other viruses on panel detected @ this time.

## 2021-09-20 NOTE — ED NOTES
Pt stated she wants to leave. Pt stated she wants to see Dr. Kirill Villa @ [facility]. Pt doesn't have specific reasons for wanting to leave. Pt reports her family is coming to get her. Pt willing to sign out AMA. Notified MD Cj Irizarry.

## 2021-09-20 NOTE — ED NOTES
Pt removed cardiac/respiratory monitoring & O2. Pt remains A&Ox4.  Pt wheeled to waiting room to await sister's arrival.

## 2021-09-21 NOTE — PROGRESS NOTES
Called to bedside when notified of patient's desire to leave AMA. She says she was tired of waiting for a bed and that we haven't done anything for her, and that she wanted her pulmonologist at another facility. Explained to her what medical interventions had already been performed and conveyed to her the seriousness of requiring 11L nc from her baseline of 2-4L nc. I expressed my concerns that she could die if she left and that all hospitals in the area are full beyond capacity. She said she was ok dying, because \"we all die sometime\" and insisted on departing. AMA form reviewed personally with patient, who exhibited capacity. Pt signed AMA form and left upon sister's arrival, who at least brought portable oxygen. I encouraged her to return if she worsens or is unable to obtain the care that she desires.

## 2022-03-18 PROBLEM — F41.8 ANXIETY WITH DEPRESSION: Status: ACTIVE | Noted: 2018-11-13

## 2022-03-18 PROBLEM — J18.9 COMMUNITY ACQUIRED PNEUMONIA OF LEFT UPPER LOBE OF LUNG: Status: ACTIVE | Noted: 2018-11-13

## 2022-03-19 PROBLEM — J96.01 ACUTE HYPOXEMIC RESPIRATORY FAILURE DUE TO COVID-19 (HCC): Status: ACTIVE | Noted: 2021-09-20

## 2022-03-19 PROBLEM — U07.1 ACUTE HYPOXEMIC RESPIRATORY FAILURE DUE TO COVID-19 (HCC): Status: ACTIVE | Noted: 2021-09-20

## 2022-03-20 PROBLEM — R06.02 SOB (SHORTNESS OF BREATH): Status: ACTIVE | Noted: 2018-11-13

## 2022-03-20 PROBLEM — E66.01 OBESITY, MORBID (HCC): Status: ACTIVE | Noted: 2020-01-30

## 2023-05-21 RX ORDER — PREGABALIN 50 MG/1
50 CAPSULE ORAL 2 TIMES DAILY PRN
COMMUNITY

## 2023-05-21 RX ORDER — ACETAMINOPHEN 325 MG/1
650 TABLET ORAL EVERY 6 HOURS PRN
COMMUNITY

## 2023-05-21 RX ORDER — ALBUTEROL SULFATE 90 UG/1
2 AEROSOL, METERED RESPIRATORY (INHALATION) EVERY 4 HOURS PRN
COMMUNITY

## 2023-05-21 RX ORDER — FLUTICASONE FUROATE, UMECLIDINIUM BROMIDE AND VILANTEROL TRIFENATATE 100; 62.5; 25 UG/1; UG/1; UG/1
1 POWDER RESPIRATORY (INHALATION) DAILY
COMMUNITY

## 2023-05-21 RX ORDER — AMLODIPINE BESYLATE 2.5 MG/1
2.5 TABLET ORAL DAILY
COMMUNITY

## 2023-05-21 RX ORDER — FUROSEMIDE 40 MG/1
40 TABLET ORAL DAILY
COMMUNITY

## 2023-05-21 RX ORDER — IRBESARTAN AND HYDROCHLOROTHIAZIDE 300; 12.5 MG/1; MG/1
1 TABLET, FILM COATED ORAL DAILY
COMMUNITY
Start: 2020-07-25

## 2023-05-21 RX ORDER — INSULIN LISPRO 100 [IU]/ML
10-15 INJECTION, SOLUTION INTRAVENOUS; SUBCUTANEOUS
COMMUNITY

## 2023-05-21 RX ORDER — TRAMADOL HYDROCHLORIDE 50 MG/1
50 TABLET ORAL EVERY 6 HOURS PRN
COMMUNITY
